# Patient Record
Sex: FEMALE | Race: WHITE | NOT HISPANIC OR LATINO | Employment: OTHER | URBAN - METROPOLITAN AREA
[De-identification: names, ages, dates, MRNs, and addresses within clinical notes are randomized per-mention and may not be internally consistent; named-entity substitution may affect disease eponyms.]

---

## 2017-02-01 RX ORDER — SPIRONOLACTONE 25 MG/1
TABLET ORAL
Qty: 30 TABLET | Refills: 2 | Status: SHIPPED | OUTPATIENT
Start: 2017-02-01 | End: 2017-05-16 | Stop reason: SDUPTHER

## 2017-02-01 RX ORDER — DOXYCYCLINE HYCLATE 100 MG
TABLET ORAL
Qty: 30 TABLET | Refills: 2 | Status: SHIPPED | OUTPATIENT
Start: 2017-02-01 | End: 2017-04-28 | Stop reason: SDUPTHER

## 2017-02-17 RX ORDER — CARVEDILOL 6.25 MG/1
TABLET ORAL
Qty: 180 TABLET | Refills: 0 | Status: SHIPPED | OUTPATIENT
Start: 2017-02-17 | End: 2017-05-14 | Stop reason: SDUPTHER

## 2017-03-10 RX ORDER — LOSARTAN POTASSIUM 50 MG/1
TABLET ORAL
Qty: 30 TABLET | Refills: 2 | Status: SHIPPED | OUTPATIENT
Start: 2017-03-10 | End: 2017-05-11 | Stop reason: SDUPTHER

## 2017-04-06 RX ORDER — GLIMEPIRIDE 2 MG/1
TABLET ORAL
Qty: 60 TABLET | Refills: 2 | Status: SHIPPED | OUTPATIENT
Start: 2017-04-06 | End: 2017-05-11 | Stop reason: SDUPTHER

## 2017-04-12 ENCOUNTER — OFFICE VISIT (OUTPATIENT)
Dept: FAMILY MEDICINE CLINIC | Facility: CLINIC | Age: 82
End: 2017-04-12

## 2017-04-12 VITALS
HEART RATE: 67 BPM | DIASTOLIC BLOOD PRESSURE: 70 MMHG | WEIGHT: 158.4 LBS | SYSTOLIC BLOOD PRESSURE: 150 MMHG | HEIGHT: 62 IN | OXYGEN SATURATION: 98 % | BODY MASS INDEX: 29.15 KG/M2 | RESPIRATION RATE: 16 BRPM | TEMPERATURE: 97.8 F

## 2017-04-12 DIAGNOSIS — E11.9 DIABETES MELLITUS TYPE II, NON INSULIN DEPENDENT (HCC): ICD-10-CM

## 2017-04-12 DIAGNOSIS — I10 ESSENTIAL HYPERTENSION: Primary | ICD-10-CM

## 2017-04-12 DIAGNOSIS — N18.30 CHRONIC KIDNEY DISEASE, STAGE III (MODERATE) (HCC): ICD-10-CM

## 2017-04-12 DIAGNOSIS — E78.00 PURE HYPERCHOLESTEROLEMIA: ICD-10-CM

## 2017-04-12 LAB
ALBUMIN SERPL-MCNC: 4.4 G/DL (ref 3.5–5.2)
ALBUMIN/GLOB SERPL: 1.6 G/DL
ALP SERPL-CCNC: 139 U/L (ref 39–117)
ALT SERPL-CCNC: 14 U/L (ref 1–33)
AST SERPL-CCNC: 15 U/L (ref 1–32)
BILIRUB SERPL-MCNC: 1 MG/DL (ref 0.1–1.2)
BUN SERPL-MCNC: 16 MG/DL (ref 8–23)
BUN/CREAT SERPL: 11.5 (ref 7–25)
CALCIUM SERPL-MCNC: 10.5 MG/DL (ref 8.6–10.5)
CHLORIDE SERPL-SCNC: 101 MMOL/L (ref 98–107)
CHOLEST SERPL-MCNC: 194 MG/DL (ref 0–200)
CO2 SERPL-SCNC: 25.7 MMOL/L (ref 22–29)
CREAT SERPL-MCNC: 1.39 MG/DL (ref 0.57–1)
GLOBULIN SER CALC-MCNC: 2.7 GM/DL
GLUCOSE SERPL-MCNC: 161 MG/DL (ref 65–99)
HBA1C MFR BLD: 6.56 % (ref 4.8–5.6)
HDLC SERPL-MCNC: 35 MG/DL (ref 40–60)
LDLC SERPL CALC-MCNC: 126 MG/DL (ref 0–100)
POTASSIUM SERPL-SCNC: 5.2 MMOL/L (ref 3.5–5.2)
PROT SERPL-MCNC: 7.1 G/DL (ref 6–8.5)
SODIUM SERPL-SCNC: 142 MMOL/L (ref 136–145)
TRIGL SERPL-MCNC: 166 MG/DL (ref 0–150)
VLDLC SERPL CALC-MCNC: 33.2 MG/DL (ref 5–40)

## 2017-04-12 PROCEDURE — 99214 OFFICE O/P EST MOD 30 MIN: CPT | Performed by: INTERNAL MEDICINE

## 2017-04-12 RX ORDER — HYDROCHLOROTHIAZIDE 12.5 MG/1
12.5 CAPSULE, GELATIN COATED ORAL DAILY
Qty: 90 CAPSULE | Refills: 1 | Status: SHIPPED | OUTPATIENT
Start: 2017-04-12 | End: 2017-09-16 | Stop reason: SDUPTHER

## 2017-04-12 NOTE — PROGRESS NOTES
"Subjective   Patient ID: Etelvina Bales is a 83 y.o. female presents with   Chief Complaint   Patient presents with   • Diabetes     6 month check, pt is fasting for labs       HPI - this patient presents today for follow-up of hypertension hypercholesterolemia type 2 diabetes and chronic kidney disease.  Overall she's feeling well.  She does feel like she's a little short of breath at times and her blood pressures just a shade high.  She has seen the cardiologist and last year she has no chest pain and no orthopnea.  Overall she describes herself as feeling good.    Assessment plan    Type 2 diabetes-Amaryl 2, we'll get an A1c    Hypertension continue losartan 50, Coreg Aldactone 25 and I'll start hydrochlorothiazide 12-1/2 we'll get a CMP    Chronic kidney disease CMP    Hyperlipidemia-fasting lipid profile    Allergies   Allergen Reactions   • Ace Inhibitors    • Codeine    • Lisinopril Cough       The following portions of the patient's history were reviewed and updated as appropriate: allergies, current medications, past family history, past medical history, past social history, past surgical history and problem list.      Review of Systems   Constitutional: Negative.    HENT: Negative.    Eyes: Negative.    Respiratory: Positive for shortness of breath.    Cardiovascular: Negative.    Gastrointestinal: Negative.    Endocrine: Negative.    Genitourinary: Negative.    Musculoskeletal: Negative.    Skin: Negative.    Allergic/Immunologic: Negative.    Neurological: Negative.    Hematological: Negative.    Psychiatric/Behavioral: Negative.        Objective     Vitals:    04/12/17 1019   BP: 150/70   Pulse: 67   Resp: 16   Temp: 97.8 °F (36.6 °C)   TempSrc: Oral   SpO2: 98%   Weight: 158 lb 6.4 oz (71.8 kg)   Height: 62\" (157.5 cm)         Physical Exam   Constitutional: She is oriented to person, place, and time. She appears well-developed and well-nourished. No distress.   HENT:   Head: Normocephalic and " atraumatic.   Eyes: Conjunctivae and EOM are normal. Pupils are equal, round, and reactive to light. Right eye exhibits no discharge. Left eye exhibits no discharge. No scleral icterus.   Neck: Normal range of motion. Neck supple. No tracheal deviation present. No thyromegaly present.   Cardiovascular: Normal rate, regular rhythm, normal heart sounds and normal pulses.  Exam reveals no gallop.    No murmur heard.  Pulmonary/Chest: Effort normal and breath sounds normal. No respiratory distress. She has no wheezes. She has no rales. She exhibits no tenderness.   Abdominal: Soft. She exhibits no distension. There is no tenderness.   Musculoskeletal: Normal range of motion.   Neurological: She is alert and oriented to person, place, and time.   Skin: Skin is warm and dry. No rash noted. No erythema. No pallor.   Psychiatric: She has a normal mood and affect. Her behavior is normal. Judgment and thought content normal.   Nursing note and vitals reviewed.        Etelvina was seen today for diabetes.    Diagnoses and all orders for this visit:    Essential hypertension  -     Lipid Panel  -     Hemoglobin A1c  -     Comprehensive Metabolic Panel    Pure hypercholesterolemia  -     Lipid Panel  -     Hemoglobin A1c  -     Comprehensive Metabolic Panel    Diabetes mellitus type II, non insulin dependent  -     Lipid Panel  -     Hemoglobin A1c  -     Comprehensive Metabolic Panel    Chronic kidney disease, stage III (moderate)  -     Lipid Panel  -     Hemoglobin A1c  -     Comprehensive Metabolic Panel    Other orders  -     hydrochlorothiazide (MICROZIDE) 12.5 MG capsule; Take 1 capsule by mouth Daily.        Call or return to clinic prn if these symptoms worsen or fail to improve as anticipated.

## 2017-04-28 RX ORDER — DOXYCYCLINE HYCLATE 100 MG
TABLET ORAL
Qty: 30 TABLET | Refills: 1 | Status: SHIPPED | OUTPATIENT
Start: 2017-04-28 | End: 2017-07-14 | Stop reason: SDUPTHER

## 2017-05-11 RX ORDER — LOSARTAN POTASSIUM 50 MG/1
50 TABLET ORAL DAILY
Qty: 90 TABLET | Refills: 2 | Status: SHIPPED | OUTPATIENT
Start: 2017-05-11 | End: 2017-12-15 | Stop reason: SDUPTHER

## 2017-05-11 RX ORDER — GLIMEPIRIDE 2 MG/1
2 TABLET ORAL 2 TIMES DAILY
Qty: 180 TABLET | Refills: 2 | Status: SHIPPED | OUTPATIENT
Start: 2017-05-11 | End: 2018-04-16 | Stop reason: SDUPTHER

## 2017-05-15 RX ORDER — CARVEDILOL 6.25 MG/1
TABLET ORAL
Qty: 180 TABLET | Refills: 0 | Status: SHIPPED | OUTPATIENT
Start: 2017-05-15 | End: 2017-08-15 | Stop reason: SDUPTHER

## 2017-05-16 RX ORDER — SPIRONOLACTONE 25 MG/1
TABLET ORAL
Qty: 30 TABLET | Refills: 1 | Status: SHIPPED | OUTPATIENT
Start: 2017-05-16 | End: 2017-07-21 | Stop reason: SDUPTHER

## 2017-06-27 RX ORDER — DOXYCYCLINE HYCLATE 100 MG
TABLET ORAL
Qty: 30 TABLET | Refills: 0 | OUTPATIENT
Start: 2017-06-27

## 2017-07-14 RX ORDER — DOXYCYCLINE HYCLATE 100 MG
TABLET ORAL
Qty: 30 TABLET | Refills: 0 | Status: SHIPPED | OUTPATIENT
Start: 2017-07-14 | End: 2017-08-15 | Stop reason: SDUPTHER

## 2017-07-21 RX ORDER — SPIRONOLACTONE 25 MG/1
TABLET ORAL
Qty: 30 TABLET | Refills: 0 | Status: SHIPPED | OUTPATIENT
Start: 2017-07-21 | End: 2017-08-15 | Stop reason: SDUPTHER

## 2017-08-15 RX ORDER — CARVEDILOL 6.25 MG/1
TABLET ORAL
Qty: 180 TABLET | Refills: 0 | Status: SHIPPED | OUTPATIENT
Start: 2017-08-15 | End: 2017-10-17 | Stop reason: SDUPTHER

## 2017-08-15 RX ORDER — SPIRONOLACTONE 25 MG/1
TABLET ORAL
Qty: 30 TABLET | Refills: 0 | Status: SHIPPED | OUTPATIENT
Start: 2017-08-15 | End: 2017-09-16 | Stop reason: SDUPTHER

## 2017-08-15 RX ORDER — DOXYCYCLINE HYCLATE 100 MG
TABLET ORAL
Qty: 30 TABLET | Refills: 0 | Status: SHIPPED | OUTPATIENT
Start: 2017-08-15 | End: 2018-04-27

## 2017-08-17 RX ORDER — DOXYCYCLINE HYCLATE 100 MG
TABLET ORAL
Qty: 30 TABLET | Refills: 0 | Status: SHIPPED | OUTPATIENT
Start: 2017-08-17 | End: 2017-10-17 | Stop reason: SDUPTHER

## 2017-09-18 RX ORDER — SPIRONOLACTONE 25 MG/1
TABLET ORAL
Qty: 30 TABLET | Refills: 0 | Status: SHIPPED | OUTPATIENT
Start: 2017-09-18 | End: 2017-10-17 | Stop reason: SDUPTHER

## 2017-09-18 RX ORDER — HYDROCHLOROTHIAZIDE 12.5 MG/1
CAPSULE, GELATIN COATED ORAL
Qty: 90 CAPSULE | Refills: 0 | Status: SHIPPED | OUTPATIENT
Start: 2017-09-18 | End: 2017-10-27

## 2017-10-17 RX ORDER — SPIRONOLACTONE 25 MG/1
TABLET ORAL
Qty: 30 TABLET | Refills: 0 | Status: SHIPPED | OUTPATIENT
Start: 2017-10-17 | End: 2017-11-16 | Stop reason: SDUPTHER

## 2017-10-17 RX ORDER — DOXYCYCLINE HYCLATE 100 MG
TABLET ORAL
Qty: 30 TABLET | Refills: 0 | Status: SHIPPED | OUTPATIENT
Start: 2017-10-17 | End: 2018-04-27

## 2017-10-17 RX ORDER — CARVEDILOL 6.25 MG/1
TABLET ORAL
Qty: 180 TABLET | Refills: 0 | Status: SHIPPED | OUTPATIENT
Start: 2017-10-17 | End: 2018-02-16 | Stop reason: SDUPTHER

## 2017-10-27 ENCOUNTER — OFFICE VISIT (OUTPATIENT)
Dept: FAMILY MEDICINE CLINIC | Facility: CLINIC | Age: 82
End: 2017-10-27

## 2017-10-27 VITALS
HEART RATE: 58 BPM | SYSTOLIC BLOOD PRESSURE: 100 MMHG | BODY MASS INDEX: 27.6 KG/M2 | RESPIRATION RATE: 16 BRPM | HEIGHT: 62 IN | DIASTOLIC BLOOD PRESSURE: 68 MMHG | WEIGHT: 150 LBS | TEMPERATURE: 98 F | OXYGEN SATURATION: 97 %

## 2017-10-27 DIAGNOSIS — I10 ESSENTIAL HYPERTENSION: Primary | ICD-10-CM

## 2017-10-27 DIAGNOSIS — Z00.00 HEALTH CARE MAINTENANCE: ICD-10-CM

## 2017-10-27 DIAGNOSIS — Z23 NEED FOR VACCINATION: ICD-10-CM

## 2017-10-27 DIAGNOSIS — E11.9 DIABETES MELLITUS TYPE II, NON INSULIN DEPENDENT (HCC): ICD-10-CM

## 2017-10-27 DIAGNOSIS — E78.00 PURE HYPERCHOLESTEROLEMIA: ICD-10-CM

## 2017-10-27 DIAGNOSIS — N18.30 CHRONIC KIDNEY DISEASE, STAGE III (MODERATE) (HCC): ICD-10-CM

## 2017-10-27 PROCEDURE — 99214 OFFICE O/P EST MOD 30 MIN: CPT | Performed by: INTERNAL MEDICINE

## 2017-10-27 PROCEDURE — 90662 IIV NO PRSV INCREASED AG IM: CPT | Performed by: INTERNAL MEDICINE

## 2017-10-27 PROCEDURE — G0008 ADMIN INFLUENZA VIRUS VAC: HCPCS | Performed by: INTERNAL MEDICINE

## 2017-10-27 NOTE — PROGRESS NOTES
"Subjective   Patient ID: Etelvina Bales is a 83 y.o. female presents with   Chief Complaint   Patient presents with   • Diabetes     f/u, fasting for labs   • Medication Reaction     The Hctz made her bp go to 97/47 and she stopped taking this makes her dizzy       HPI - This patient presents today for follow-up of essential hypertension hypercholesterolemia type 2 diabetes stage III chronic kidney disease.  She could not tolerate hydrochlorothiazide last time it made her dizzy blood pressure fell down to low.  She refuses mammograms.  She is oriented fully vaccine.    Assessment plan    Essential hypertension and congestive heart failure continue carvedilol losartan and spironolactone    Hypercholesterolemia will get a fasting lipid profile    Type 2 diabetes Amaryl will get an A1c    Health care maintenance she's had flu vaccine but refuses mammogram.    Allergies   Allergen Reactions   • Ace Inhibitors    • Codeine    • Lisinopril Cough       The following portions of the patient's history were reviewed and updated as appropriate: allergies, current medications, past family history, past medical history, past social history, past surgical history and problem list.      Review of Systems   Constitutional: Negative.    HENT: Negative.    Eyes: Negative.    Respiratory: Negative.    Cardiovascular: Negative.    Gastrointestinal: Negative.    Endocrine: Negative.    Genitourinary: Negative.    Musculoskeletal: Positive for gait problem.   Skin: Negative.    Allergic/Immunologic: Negative.    Hematological: Negative.    Psychiatric/Behavioral: Negative.        Objective     Vitals:    10/27/17 1012   BP: 100/68   Pulse: 58   Resp: 16   Temp: 98 °F (36.7 °C)   TempSrc: Oral   SpO2: 97%   Weight: 150 lb (68 kg)   Height: 62\" (157.5 cm)         Physical Exam   Constitutional: She is oriented to person, place, and time. She appears well-developed and well-nourished. No distress.   Elderly and frail-appearing   HENT: "   Head: Normocephalic and atraumatic.   Eyes: Conjunctivae and EOM are normal. Pupils are equal, round, and reactive to light. Right eye exhibits no discharge. Left eye exhibits no discharge. No scleral icterus.   Neck: Normal range of motion. Neck supple. No tracheal deviation present. No thyromegaly present.   Cardiovascular: Normal rate, regular rhythm, normal heart sounds and normal pulses.  Exam reveals no gallop.    No murmur heard.  Pulmonary/Chest: Effort normal and breath sounds normal. No respiratory distress. She has no wheezes. She has no rales. She exhibits no tenderness.   Abdominal: Soft. Bowel sounds are normal. There is no tenderness.   Musculoskeletal: Normal range of motion.   Neurological: She is alert and oriented to person, place, and time.   Skin: Skin is warm and dry. No rash noted. No erythema. No pallor.   Psychiatric: She has a normal mood and affect. Her behavior is normal. Judgment and thought content normal.   Nursing note and vitals reviewed.        Etelvina was seen today for diabetes and medication reaction.    Diagnoses and all orders for this visit:    Essential hypertension  -     Lipid Panel  -     Hemoglobin A1c  -     Microalbumin / Creatinine Urine Ratio - Urine, Clean Catch  -     Comprehensive Metabolic Panel    Pure hypercholesterolemia  -     Lipid Panel  -     Hemoglobin A1c  -     Microalbumin / Creatinine Urine Ratio - Urine, Clean Catch  -     Comprehensive Metabolic Panel    Diabetes mellitus type II, non insulin dependent  -     Lipid Panel  -     Hemoglobin A1c  -     Microalbumin / Creatinine Urine Ratio - Urine, Clean Catch  -     Comprehensive Metabolic Panel    Chronic kidney disease, stage III (moderate)  -     Lipid Panel  -     Hemoglobin A1c  -     Microalbumin / Creatinine Urine Ratio - Urine, Clean Catch  -     Comprehensive Metabolic Panel    Health care maintenance  -     Lipid Panel  -     Hemoglobin A1c  -     Microalbumin / Creatinine Urine Ratio -  Urine, Clean Catch  -     Comprehensive Metabolic Panel        Call or return to clinic prn if these symptoms worsen or fail to improve as anticipated.

## 2017-10-28 LAB
ALBUMIN SERPL-MCNC: 4.5 G/DL (ref 3.5–5.2)
ALBUMIN/CREAT UR: 13.2 MG/G CREAT (ref 0–30)
ALBUMIN/GLOB SERPL: 1.9 G/DL
ALP SERPL-CCNC: 130 U/L (ref 39–117)
ALT SERPL-CCNC: 13 U/L (ref 1–33)
AST SERPL-CCNC: 11 U/L (ref 1–32)
BILIRUB SERPL-MCNC: 0.8 MG/DL (ref 0.1–1.2)
BUN SERPL-MCNC: 14 MG/DL (ref 8–23)
BUN/CREAT SERPL: 11 (ref 7–25)
CALCIUM SERPL-MCNC: 10 MG/DL (ref 8.6–10.5)
CHLORIDE SERPL-SCNC: 100 MMOL/L (ref 98–107)
CHOLEST SERPL-MCNC: 192 MG/DL (ref 0–200)
CO2 SERPL-SCNC: 25.6 MMOL/L (ref 22–29)
CREAT SERPL-MCNC: 1.27 MG/DL (ref 0.57–1)
CREAT UR-MCNC: 150.6 MG/DL
GFR SERPLBLD CREATININE-BSD FMLA CKD-EPI: 40 ML/MIN/1.73
GFR SERPLBLD CREATININE-BSD FMLA CKD-EPI: 49 ML/MIN/1.73
GLOBULIN SER CALC-MCNC: 2.4 GM/DL
GLUCOSE SERPL-MCNC: 142 MG/DL (ref 65–99)
HBA1C MFR BLD: 6.5 % (ref 4.8–5.6)
HDLC SERPL-MCNC: 38 MG/DL (ref 40–60)
LDLC SERPL CALC-MCNC: 124 MG/DL (ref 0–100)
MICROALBUMIN UR-MCNC: 19.9 UG/ML
POTASSIUM SERPL-SCNC: 4.2 MMOL/L (ref 3.5–5.2)
PROT SERPL-MCNC: 6.9 G/DL (ref 6–8.5)
SODIUM SERPL-SCNC: 139 MMOL/L (ref 136–145)
TRIGL SERPL-MCNC: 150 MG/DL (ref 0–150)
VLDLC SERPL CALC-MCNC: 30 MG/DL (ref 5–40)

## 2017-10-30 NOTE — PROGRESS NOTES
Pt informed and she requested a copy be mailed to her home, I have put this in the mail for her today.

## 2017-11-17 RX ORDER — SPIRONOLACTONE 25 MG/1
TABLET ORAL
Qty: 30 TABLET | Refills: 3 | Status: SHIPPED | OUTPATIENT
Start: 2017-11-17 | End: 2018-03-18 | Stop reason: SDUPTHER

## 2017-11-17 RX ORDER — DOXYCYCLINE HYCLATE 100 MG
TABLET ORAL
Qty: 30 TABLET | Refills: 3 | Status: SHIPPED | OUTPATIENT
Start: 2017-11-17 | End: 2018-03-18 | Stop reason: SDUPTHER

## 2017-12-15 RX ORDER — HYDROCHLOROTHIAZIDE 12.5 MG/1
CAPSULE, GELATIN COATED ORAL
Qty: 90 CAPSULE | Refills: 0 | Status: SHIPPED | OUTPATIENT
Start: 2017-12-15 | End: 2018-03-18 | Stop reason: SDUPTHER

## 2017-12-15 RX ORDER — LOSARTAN POTASSIUM 50 MG/1
TABLET ORAL
Qty: 66 TABLET | Refills: 1 | Status: SHIPPED | OUTPATIENT
Start: 2017-12-15 | End: 2018-02-16 | Stop reason: SDUPTHER

## 2018-01-01 DIAGNOSIS — L71.9 ROSACEA: Primary | ICD-10-CM

## 2018-01-01 RX ORDER — LOSARTAN POTASSIUM 50 MG/1
50 TABLET ORAL DAILY
Qty: 90 TABLET | Refills: 0 | Status: SHIPPED | OUTPATIENT
Start: 2018-01-01

## 2018-01-01 RX ORDER — DOXYCYCLINE HYCLATE 100 MG
TABLET ORAL
Qty: 30 TABLET | Refills: 0 | OUTPATIENT
Start: 2018-01-01

## 2018-01-01 RX ORDER — DOXYCYCLINE HYCLATE 100 MG
TABLET ORAL
Qty: 30 TABLET | Refills: 0 | Status: SHIPPED | OUTPATIENT
Start: 2018-01-01 | End: 2019-01-01 | Stop reason: SDUPTHER

## 2018-01-01 RX ORDER — GLIMEPIRIDE 2 MG/1
2 TABLET ORAL 2 TIMES DAILY
Qty: 180 TABLET | Refills: 0 | Status: SHIPPED | OUTPATIENT
Start: 2018-01-01

## 2018-01-01 RX ORDER — SPIRONOLACTONE 25 MG/1
25 TABLET ORAL DAILY
Qty: 30 TABLET | Refills: 2 | Status: ON HOLD | OUTPATIENT
Start: 2018-01-01 | End: 2019-01-01

## 2018-01-01 RX ORDER — DOXYCYCLINE 40 MG/1
40 CAPSULE ORAL EVERY MORNING
Qty: 90 CAPSULE | Refills: 0 | Status: SHIPPED | OUTPATIENT
Start: 2018-01-01 | End: 2019-01-01

## 2018-01-01 RX ORDER — HYDROCHLOROTHIAZIDE 12.5 MG/1
12.5 CAPSULE, GELATIN COATED ORAL DAILY
Qty: 90 CAPSULE | Refills: 1 | Status: SHIPPED | OUTPATIENT
Start: 2018-01-01

## 2018-02-16 RX ORDER — CARVEDILOL 6.25 MG/1
TABLET ORAL
Qty: 180 TABLET | Refills: 0 | Status: SHIPPED | OUTPATIENT
Start: 2018-02-16 | End: 2018-04-27 | Stop reason: SDUPTHER

## 2018-02-16 RX ORDER — LOSARTAN POTASSIUM 50 MG/1
TABLET ORAL
Qty: 66 TABLET | Refills: 0 | Status: SHIPPED | OUTPATIENT
Start: 2018-02-16 | End: 2018-04-27 | Stop reason: CLARIF

## 2018-03-19 RX ORDER — HYDROCHLOROTHIAZIDE 12.5 MG/1
CAPSULE, GELATIN COATED ORAL
Qty: 88 CAPSULE | Refills: 0 | Status: SHIPPED | OUTPATIENT
Start: 2018-03-19 | End: 2018-04-27 | Stop reason: SDUPTHER

## 2018-03-19 RX ORDER — SPIRONOLACTONE 25 MG/1
TABLET ORAL
Qty: 30 TABLET | Refills: 2 | Status: SHIPPED | OUTPATIENT
Start: 2018-03-19 | End: 2018-04-27 | Stop reason: CLARIF

## 2018-03-19 RX ORDER — DOXYCYCLINE HYCLATE 100 MG
TABLET ORAL
Qty: 30 TABLET | Refills: 2 | Status: SHIPPED | OUTPATIENT
Start: 2018-03-19 | End: 2018-06-13 | Stop reason: SDUPTHER

## 2018-04-17 RX ORDER — GLIMEPIRIDE 2 MG/1
TABLET ORAL
Qty: 180 TABLET | Refills: 0 | Status: SHIPPED | OUTPATIENT
Start: 2018-04-17 | End: 2018-04-27 | Stop reason: SDUPTHER

## 2018-04-27 ENCOUNTER — OFFICE VISIT (OUTPATIENT)
Dept: FAMILY MEDICINE CLINIC | Facility: CLINIC | Age: 83
End: 2018-04-27

## 2018-04-27 VITALS
SYSTOLIC BLOOD PRESSURE: 136 MMHG | DIASTOLIC BLOOD PRESSURE: 68 MMHG | TEMPERATURE: 98 F | HEART RATE: 68 BPM | HEIGHT: 62 IN | WEIGHT: 144 LBS | BODY MASS INDEX: 26.5 KG/M2 | RESPIRATION RATE: 16 BRPM | OXYGEN SATURATION: 98 %

## 2018-04-27 DIAGNOSIS — E78.00 PURE HYPERCHOLESTEROLEMIA: Primary | ICD-10-CM

## 2018-04-27 DIAGNOSIS — I10 ESSENTIAL HYPERTENSION: ICD-10-CM

## 2018-04-27 DIAGNOSIS — E87.5 HYPERKALEMIA: ICD-10-CM

## 2018-04-27 DIAGNOSIS — E11.9 DIABETES MELLITUS TYPE II, NON INSULIN DEPENDENT (HCC): ICD-10-CM

## 2018-04-27 DIAGNOSIS — N18.30 CHRONIC KIDNEY DISEASE, STAGE III (MODERATE) (HCC): ICD-10-CM

## 2018-04-27 PROCEDURE — 99214 OFFICE O/P EST MOD 30 MIN: CPT | Performed by: INTERNAL MEDICINE

## 2018-04-27 RX ORDER — SPIRONOLACTONE 25 MG/1
TABLET ORAL
Qty: 30 TABLET | Refills: 2 | Status: SHIPPED | OUTPATIENT
Start: 2018-04-27 | End: 2018-01-01 | Stop reason: SDUPTHER

## 2018-04-27 RX ORDER — GLIMEPIRIDE 2 MG/1
TABLET ORAL
Qty: 180 TABLET | Refills: 0 | Status: SHIPPED | OUTPATIENT
Start: 2018-04-27 | End: 2018-01-01 | Stop reason: SDUPTHER

## 2018-04-27 RX ORDER — HYDROCHLOROTHIAZIDE 12.5 MG/1
CAPSULE, GELATIN COATED ORAL
Qty: 88 CAPSULE | Refills: 0 | Status: SHIPPED | OUTPATIENT
Start: 2018-04-27 | End: 2018-01-01 | Stop reason: SDUPTHER

## 2018-04-27 RX ORDER — CARVEDILOL 6.25 MG/1
TABLET ORAL
Qty: 180 TABLET | Refills: 0 | Status: SHIPPED | OUTPATIENT
Start: 2018-04-27 | End: 2018-08-20 | Stop reason: SDUPTHER

## 2018-04-27 RX ORDER — LOSARTAN POTASSIUM 50 MG/1
TABLET ORAL
Qty: 66 TABLET | Refills: 0 | Status: SHIPPED | OUTPATIENT
Start: 2018-04-27 | End: 2018-01-01 | Stop reason: SDUPTHER

## 2018-04-27 NOTE — PROGRESS NOTES
"Subjective   Patient ID: Etelvina Bales is a 84 y.o. female presents with   Chief Complaint   Patient presents with   • Hypertension       HPI - this patient presents today for follow-up of hypertension chronic kidney disease type 2 diabetesHyperkalemia and hypercholesterolemia.  Overall she feels good and is taking good care of herself she needs lab work today.    Assessment plan    Hypertension and congestive heart failure continue losartan and Aldactone and Coreg will get a metabolic panel    Hyperkalemia check a metabolic panel    Type 2 diabetes continue current regimen A1c    Hypercholesterolemia-check a fasting lipid profile        Allergies   Allergen Reactions   • Ace Inhibitors    • Codeine    • Lisinopril Cough       The following portions of the patient's history were reviewed and updated as appropriate: allergies, current medications, past family history, past medical history, past social history, past surgical history and problem list.      Review of Systems   Constitutional: Negative.    HENT: Negative.    Eyes: Negative.    Respiratory: Negative.    Cardiovascular: Negative.    Gastrointestinal: Negative.    Endocrine: Negative.    Genitourinary: Negative.    Musculoskeletal: Negative.    Skin: Negative.    Allergic/Immunologic: Negative.    Neurological: Negative.    Hematological: Negative.    Psychiatric/Behavioral: Negative.        Objective     Vitals:    04/27/18 0914   BP: 136/68   Pulse: 68   Resp: 16   Temp: 98 °F (36.7 °C)   TempSrc: Oral   SpO2: 98%   Weight: 65.3 kg (144 lb)   Height: 157.5 cm (62\")         Physical Exam   Constitutional: She is oriented to person, place, and time. She appears well-developed and well-nourished. No distress.   HENT:   Head: Normocephalic and atraumatic.   Eyes: Conjunctivae and EOM are normal. Pupils are equal, round, and reactive to light. Right eye exhibits no discharge. Left eye exhibits no discharge. No scleral icterus.   Neck: Normal range of " motion. Neck supple. No tracheal deviation present. No thyromegaly present.   Cardiovascular: Normal rate, regular rhythm, normal heart sounds and normal pulses.  Exam reveals no gallop.    No murmur heard.  Pulmonary/Chest: Effort normal and breath sounds normal. No respiratory distress. She has no wheezes. She has no rales. She exhibits no tenderness.   Abdominal: There is no tenderness.   Musculoskeletal: Normal range of motion.   Neurological: She is alert and oriented to person, place, and time. She exhibits normal muscle tone. Coordination normal.   Skin: Skin is warm and dry. No rash noted. No erythema. No pallor.   Psychiatric: She has a normal mood and affect. Her behavior is normal. Judgment and thought content normal.   Nursing note and vitals reviewed.        Etelvina was seen today for hypertension.    Diagnoses and all orders for this visit:    Pure hypercholesterolemia  -     Hemoglobin A1c  -     Lipid Panel  -     Microalbumin / Creatinine Urine Ratio - Urine, Clean Catch  -     Comprehensive Metabolic Panel    Diabetes mellitus type II, non insulin dependent  -     Hemoglobin A1c  -     Lipid Panel  -     Microalbumin / Creatinine Urine Ratio - Urine, Clean Catch  -     Comprehensive Metabolic Panel    Chronic kidney disease, stage III (moderate)  -     Hemoglobin A1c  -     Lipid Panel  -     Microalbumin / Creatinine Urine Ratio - Urine, Clean Catch  -     Comprehensive Metabolic Panel    Hyperkalemia  -     Hemoglobin A1c  -     Lipid Panel  -     Microalbumin / Creatinine Urine Ratio - Urine, Clean Catch  -     Comprehensive Metabolic Panel    Essential hypertension  -     Hemoglobin A1c  -     Lipid Panel  -     Microalbumin / Creatinine Urine Ratio - Urine, Clean Catch  -     Comprehensive Metabolic Panel        Call or return to clinic prn if these symptoms worsen or fail to improve as anticipated.

## 2018-04-28 LAB
ALBUMIN SERPL-MCNC: 4.5 G/DL (ref 3.5–5.2)
ALBUMIN/CREAT UR: 15.1 MG/G CREAT (ref 0–30)
ALBUMIN/GLOB SERPL: 2 G/DL
ALP SERPL-CCNC: 116 U/L (ref 39–117)
ALT SERPL-CCNC: 9 U/L (ref 1–33)
AST SERPL-CCNC: 13 U/L (ref 1–32)
BILIRUB SERPL-MCNC: 1 MG/DL (ref 0.1–1.2)
BUN SERPL-MCNC: 17 MG/DL (ref 8–23)
BUN/CREAT SERPL: 13.4 (ref 7–25)
CALCIUM SERPL-MCNC: 10 MG/DL (ref 8.6–10.5)
CHLORIDE SERPL-SCNC: 98 MMOL/L (ref 98–107)
CHOLEST SERPL-MCNC: 192 MG/DL (ref 0–200)
CO2 SERPL-SCNC: 28.6 MMOL/L (ref 22–29)
CREAT SERPL-MCNC: 1.27 MG/DL (ref 0.57–1)
CREAT UR-MCNC: 134.6 MG/DL
GFR SERPLBLD CREATININE-BSD FMLA CKD-EPI: 40 ML/MIN/1.73
GFR SERPLBLD CREATININE-BSD FMLA CKD-EPI: 49 ML/MIN/1.73
GLOBULIN SER CALC-MCNC: 2.2 GM/DL
GLUCOSE SERPL-MCNC: 102 MG/DL (ref 65–99)
HBA1C MFR BLD: 5.73 % (ref 4.8–5.6)
HDLC SERPL-MCNC: 39 MG/DL (ref 40–60)
LDLC SERPL CALC-MCNC: 126 MG/DL (ref 0–100)
MICROALBUMIN UR-MCNC: 20.3 UG/ML
POTASSIUM SERPL-SCNC: 4.1 MMOL/L (ref 3.5–5.2)
PROT SERPL-MCNC: 6.7 G/DL (ref 6–8.5)
SODIUM SERPL-SCNC: 139 MMOL/L (ref 136–145)
TRIGL SERPL-MCNC: 137 MG/DL (ref 0–150)
VLDLC SERPL CALC-MCNC: 27.4 MG/DL (ref 5–40)

## 2018-05-15 ENCOUNTER — TELEPHONE (OUTPATIENT)
Dept: FAMILY MEDICINE CLINIC | Facility: CLINIC | Age: 83
End: 2018-05-15

## 2018-05-16 RX ORDER — MONTELUKAST SODIUM 10 MG/1
10 TABLET ORAL NIGHTLY
Qty: 30 TABLET | Refills: 2 | Status: SHIPPED | OUTPATIENT
Start: 2018-05-16

## 2018-06-13 ENCOUNTER — TELEPHONE (OUTPATIENT)
Dept: FAMILY MEDICINE CLINIC | Facility: CLINIC | Age: 83
End: 2018-06-13

## 2018-06-13 RX ORDER — DOXYCYCLINE HYCLATE 100 MG
100 TABLET ORAL DAILY
Qty: 30 TABLET | Refills: 0 | Status: SHIPPED | OUTPATIENT
Start: 2018-06-13 | End: 2018-01-01 | Stop reason: DRUGHIGH

## 2018-06-13 NOTE — TELEPHONE ENCOUNTER
catherine requesting refill on doxycycline 100 mg #30. She is former pt of [facility] and will be seeing Molly on 11/30/18

## 2018-08-10 RX ORDER — DOXYCYCLINE HYCLATE 100 MG
TABLET ORAL
Qty: 30 TABLET | Refills: 0 | OUTPATIENT
Start: 2018-08-10

## 2018-08-20 RX ORDER — CARVEDILOL 6.25 MG/1
6.25 TABLET ORAL 2 TIMES DAILY
Qty: 180 TABLET | Refills: 0 | Status: SHIPPED | OUTPATIENT
Start: 2018-08-20 | End: 2019-01-01 | Stop reason: SDUPTHER

## 2019-01-01 ENCOUNTER — HOSPITAL ENCOUNTER (INPATIENT)
Facility: HOSPITAL | Age: 84
LOS: 2 days | End: 2019-09-03
Attending: EMERGENCY MEDICINE | Admitting: HOSPITALIST

## 2019-01-01 ENCOUNTER — APPOINTMENT (OUTPATIENT)
Dept: GENERAL RADIOLOGY | Facility: HOSPITAL | Age: 84
End: 2019-01-01

## 2019-01-01 ENCOUNTER — APPOINTMENT (OUTPATIENT)
Dept: CT IMAGING | Facility: HOSPITAL | Age: 84
End: 2019-01-01

## 2019-01-01 VITALS
DIASTOLIC BLOOD PRESSURE: 59 MMHG | HEIGHT: 62 IN | BODY MASS INDEX: 22.16 KG/M2 | WEIGHT: 120.4 LBS | SYSTOLIC BLOOD PRESSURE: 109 MMHG | TEMPERATURE: 98.1 F | RESPIRATION RATE: 28 BRPM

## 2019-01-01 DIAGNOSIS — J06.9 UPPER RESPIRATORY TRACT INFECTION, UNSPECIFIED TYPE: ICD-10-CM

## 2019-01-01 DIAGNOSIS — I10 UNCONTROLLED HYPERTENSION: Primary | ICD-10-CM

## 2019-01-01 DIAGNOSIS — R44.1 VISUAL HALLUCINATIONS: ICD-10-CM

## 2019-01-01 LAB
ALBUMIN SERPL-MCNC: 3.2 G/DL (ref 3.5–5.2)
ALBUMIN SERPL-MCNC: 3.8 G/DL (ref 3.5–5.2)
ALBUMIN SERPL-MCNC: 4.1 G/DL (ref 3.5–5.2)
ALBUMIN/GLOB SERPL: 1.7 G/DL
ALP SERPL-CCNC: 95 U/L (ref 39–117)
ALT SERPL W P-5'-P-CCNC: 11 U/L (ref 1–33)
AMMONIA BLD-SCNC: 29 UMOL/L (ref 11–51)
ANION GAP SERPL CALCULATED.3IONS-SCNC: 10.2 MMOL/L (ref 5–15)
ANION GAP SERPL CALCULATED.3IONS-SCNC: 10.9 MMOL/L (ref 5–15)
ANION GAP SERPL CALCULATED.3IONS-SCNC: 12.5 MMOL/L (ref 5–15)
ANION GAP SERPL CALCULATED.3IONS-SCNC: 9.4 MMOL/L (ref 5–15)
ARTERIAL PATENCY WRIST A: POSITIVE
AST SERPL-CCNC: 18 U/L (ref 1–32)
ATMOSPHERIC PRESS: 749.2 MMHG
B PARAPERT DNA SPEC QL NAA+PROBE: NOT DETECTED
B PERT DNA SPEC QL NAA+PROBE: NOT DETECTED
BACTERIA SPEC AEROBE CULT: NORMAL
BACTERIA UR QL AUTO: ABNORMAL /HPF
BASE EXCESS BLDA CALC-SCNC: -0.5 MMOL/L (ref 0–2)
BASOPHILS # BLD AUTO: 0.04 10*3/MM3 (ref 0–0.2)
BASOPHILS NFR BLD AUTO: 0.4 % (ref 0–1.5)
BDY SITE: ABNORMAL
BILIRUB SERPL-MCNC: 0.6 MG/DL (ref 0.2–1.2)
BILIRUB UR QL STRIP: NEGATIVE
BUN BLD-MCNC: 12 MG/DL (ref 8–23)
BUN BLD-MCNC: 14 MG/DL (ref 8–23)
BUN BLD-MCNC: 15 MG/DL (ref 8–23)
BUN BLD-MCNC: 16 MG/DL (ref 8–23)
BUN/CREAT SERPL: 13.2 (ref 7–25)
BUN/CREAT SERPL: 15.8 (ref 7–25)
BUN/CREAT SERPL: 15.8 (ref 7–25)
BUN/CREAT SERPL: 17.9 (ref 7–25)
C PNEUM DNA NPH QL NAA+NON-PROBE: NOT DETECTED
CALCIUM SPEC-SCNC: 8.9 MG/DL (ref 8.6–10.5)
CALCIUM SPEC-SCNC: 9.1 MG/DL (ref 8.6–10.5)
CALCIUM SPEC-SCNC: 9.5 MG/DL (ref 8.6–10.5)
CALCIUM SPEC-SCNC: 9.6 MG/DL (ref 8.6–10.5)
CHLORIDE SERPL-SCNC: 90 MMOL/L (ref 98–107)
CHLORIDE SERPL-SCNC: 91 MMOL/L (ref 98–107)
CHLORIDE SERPL-SCNC: 94 MMOL/L (ref 98–107)
CHLORIDE SERPL-SCNC: 94 MMOL/L (ref 98–107)
CLARITY UR: CLEAR
CO2 SERPL-SCNC: 24.1 MMOL/L (ref 22–29)
CO2 SERPL-SCNC: 25.5 MMOL/L (ref 22–29)
CO2 SERPL-SCNC: 25.6 MMOL/L (ref 22–29)
CO2 SERPL-SCNC: 26.8 MMOL/L (ref 22–29)
COLOR UR: YELLOW
CREAT BLD-MCNC: 0.78 MG/DL (ref 0.57–1)
CREAT BLD-MCNC: 0.91 MG/DL (ref 0.57–1)
CREAT BLD-MCNC: 0.95 MG/DL (ref 0.57–1)
CREAT BLD-MCNC: 1.01 MG/DL (ref 0.57–1)
D-LACTATE SERPL-SCNC: 0.5 MMOL/L (ref 0.5–2)
DEPRECATED RDW RBC AUTO: 43.8 FL (ref 37–54)
DEPRECATED RDW RBC AUTO: 44.6 FL (ref 37–54)
DEPRECATED RDW RBC AUTO: 46.3 FL (ref 37–54)
EOSINOPHIL # BLD AUTO: 0.07 10*3/MM3 (ref 0–0.4)
EOSINOPHIL NFR BLD AUTO: 0.7 % (ref 0.3–6.2)
ERYTHROCYTE [DISTWIDTH] IN BLOOD BY AUTOMATED COUNT: 13.5 % (ref 12.3–15.4)
ERYTHROCYTE [DISTWIDTH] IN BLOOD BY AUTOMATED COUNT: 13.6 % (ref 12.3–15.4)
ERYTHROCYTE [DISTWIDTH] IN BLOOD BY AUTOMATED COUNT: 13.9 % (ref 12.3–15.4)
FLUAV H1 2009 PAND RNA NPH QL NAA+PROBE: NOT DETECTED
FLUAV H1 HA GENE NPH QL NAA+PROBE: NOT DETECTED
FLUAV H3 RNA NPH QL NAA+PROBE: NOT DETECTED
FLUAV SUBTYP SPEC NAA+PROBE: NOT DETECTED
FLUBV RNA ISLT QL NAA+PROBE: NOT DETECTED
GFR SERPL CREATININE-BSD FRML MDRD: 52 ML/MIN/1.73
GFR SERPL CREATININE-BSD FRML MDRD: 56 ML/MIN/1.73
GFR SERPL CREATININE-BSD FRML MDRD: 59 ML/MIN/1.73
GFR SERPL CREATININE-BSD FRML MDRD: 70 ML/MIN/1.73
GLOBULIN UR ELPH-MCNC: 2.4 GM/DL
GLUCOSE BLD-MCNC: 119 MG/DL (ref 65–99)
GLUCOSE BLD-MCNC: 141 MG/DL (ref 65–99)
GLUCOSE BLD-MCNC: 96 MG/DL (ref 65–99)
GLUCOSE BLD-MCNC: 96 MG/DL (ref 65–99)
GLUCOSE BLDC GLUCOMTR-MCNC: 109 MG/DL (ref 70–130)
GLUCOSE BLDC GLUCOMTR-MCNC: 115 MG/DL (ref 70–130)
GLUCOSE BLDC GLUCOMTR-MCNC: 115 MG/DL (ref 70–130)
GLUCOSE BLDC GLUCOMTR-MCNC: 120 MG/DL (ref 70–130)
GLUCOSE BLDC GLUCOMTR-MCNC: 141 MG/DL (ref 70–130)
GLUCOSE BLDC GLUCOMTR-MCNC: 175 MG/DL (ref 70–130)
GLUCOSE BLDC GLUCOMTR-MCNC: 186 MG/DL (ref 70–130)
GLUCOSE BLDC GLUCOMTR-MCNC: 214 MG/DL (ref 70–130)
GLUCOSE BLDC GLUCOMTR-MCNC: 91 MG/DL (ref 70–130)
GLUCOSE BLDC GLUCOMTR-MCNC: 97 MG/DL (ref 70–130)
GLUCOSE BLDC GLUCOMTR-MCNC: 98 MG/DL (ref 70–130)
GLUCOSE BLDC GLUCOMTR-MCNC: 99 MG/DL (ref 70–130)
GLUCOSE UR STRIP-MCNC: NEGATIVE MG/DL
HADV DNA SPEC NAA+PROBE: NOT DETECTED
HBA1C MFR BLD: 4.5 % (ref 4.8–5.6)
HCO3 BLDA-SCNC: 26 MMOL/L (ref 22–28)
HCOV 229E RNA SPEC QL NAA+PROBE: NOT DETECTED
HCOV HKU1 RNA SPEC QL NAA+PROBE: NOT DETECTED
HCOV NL63 RNA SPEC QL NAA+PROBE: NOT DETECTED
HCOV OC43 RNA SPEC QL NAA+PROBE: NOT DETECTED
HCT VFR BLD AUTO: 37.3 % (ref 34–46.6)
HCT VFR BLD AUTO: 39.3 % (ref 34–46.6)
HCT VFR BLD AUTO: 40.7 % (ref 34–46.6)
HGB BLD-MCNC: 12.6 G/DL (ref 12–15.9)
HGB BLD-MCNC: 13.3 G/DL (ref 12–15.9)
HGB BLD-MCNC: 13.4 G/DL (ref 12–15.9)
HGB UR QL STRIP.AUTO: NEGATIVE
HMPV RNA NPH QL NAA+NON-PROBE: NOT DETECTED
HOROWITZ INDEX BLD+IHG-RTO: 21 %
HPIV1 RNA SPEC QL NAA+PROBE: NOT DETECTED
HPIV2 RNA SPEC QL NAA+PROBE: NOT DETECTED
HPIV3 RNA NPH QL NAA+PROBE: NOT DETECTED
HPIV4 P GENE NPH QL NAA+PROBE: NOT DETECTED
HYALINE CASTS UR QL AUTO: ABNORMAL /LPF
IMM GRANULOCYTES # BLD AUTO: 0.09 10*3/MM3 (ref 0–0.05)
IMM GRANULOCYTES NFR BLD AUTO: 0.9 % (ref 0–0.5)
KETONES UR QL STRIP: ABNORMAL
LEUKOCYTE ESTERASE UR QL STRIP.AUTO: ABNORMAL
LYMPHOCYTES # BLD AUTO: 1.09 10*3/MM3 (ref 0.7–3.1)
LYMPHOCYTES NFR BLD AUTO: 10.7 % (ref 19.6–45.3)
M PNEUMO IGG SER IA-ACNC: NOT DETECTED
MAGNESIUM SERPL-MCNC: 1.9 MG/DL (ref 1.6–2.4)
MCH RBC QN AUTO: 29.6 PG (ref 26.6–33)
MCH RBC QN AUTO: 29.7 PG (ref 26.6–33)
MCH RBC QN AUTO: 30.3 PG (ref 26.6–33)
MCHC RBC AUTO-ENTMCNC: 32.9 G/DL (ref 31.5–35.7)
MCHC RBC AUTO-ENTMCNC: 33.8 G/DL (ref 31.5–35.7)
MCHC RBC AUTO-ENTMCNC: 33.8 G/DL (ref 31.5–35.7)
MCV RBC AUTO: 87.6 FL (ref 79–97)
MCV RBC AUTO: 89.5 FL (ref 79–97)
MCV RBC AUTO: 90.2 FL (ref 79–97)
MODALITY: ABNORMAL
MONOCYTES # BLD AUTO: 0.66 10*3/MM3 (ref 0.1–0.9)
MONOCYTES NFR BLD AUTO: 6.5 % (ref 5–12)
NEUTROPHILS # BLD AUTO: 8.26 10*3/MM3 (ref 1.7–7)
NEUTROPHILS NFR BLD AUTO: 80.8 % (ref 42.7–76)
NITRITE UR QL STRIP: NEGATIVE
NRBC BLD AUTO-RTO: 0 /100 WBC (ref 0–0.2)
NT-PROBNP SERPL-MCNC: 1415 PG/ML (ref 5–1800)
O2 A-A PPRESDIFF RESPIRATORY: 0.8 MMHG
PCO2 BLDA: 49.4 MM HG (ref 35–45)
PH BLDA: 7.33 PH UNITS (ref 7.35–7.45)
PH UR STRIP.AUTO: 6.5 [PH] (ref 5–8)
PHOSPHATE SERPL-MCNC: 2.6 MG/DL (ref 2.5–4.5)
PHOSPHATE SERPL-MCNC: 3.5 MG/DL (ref 2.5–4.5)
PLATELET # BLD AUTO: 149 10*3/MM3 (ref 140–450)
PLATELET # BLD AUTO: 161 10*3/MM3 (ref 140–450)
PLATELET # BLD AUTO: 196 10*3/MM3 (ref 140–450)
PMV BLD AUTO: 10.4 FL (ref 6–12)
PMV BLD AUTO: 11 FL (ref 6–12)
PMV BLD AUTO: 11.4 FL (ref 6–12)
PO2 BLDA: 76.7 MM HG (ref 80–100)
POTASSIUM BLD-SCNC: 3.8 MMOL/L (ref 3.5–5.2)
POTASSIUM BLD-SCNC: 3.8 MMOL/L (ref 3.5–5.2)
POTASSIUM BLD-SCNC: 4 MMOL/L (ref 3.5–5.2)
POTASSIUM BLD-SCNC: 4.1 MMOL/L (ref 3.5–5.2)
PROCALCITONIN SERPL-MCNC: 0.1 NG/ML (ref 0.1–0.25)
PROCALCITONIN SERPL-MCNC: 0.13 NG/ML (ref 0.1–0.25)
PROT SERPL-MCNC: 6.5 G/DL (ref 6–8.5)
PROT UR QL STRIP: NEGATIVE
RBC # BLD AUTO: 4.26 10*6/MM3 (ref 3.77–5.28)
RBC # BLD AUTO: 4.39 10*6/MM3 (ref 3.77–5.28)
RBC # BLD AUTO: 4.51 10*6/MM3 (ref 3.77–5.28)
RBC # UR: ABNORMAL /HPF
REF LAB TEST METHOD: ABNORMAL
RHINOVIRUS RNA SPEC NAA+PROBE: DETECTED
RSV RNA NPH QL NAA+NON-PROBE: NOT DETECTED
SAO2 % BLDCOA: 94 % (ref 92–99)
SET MECH RESP RATE: 18
SODIUM BLD-SCNC: 126 MMOL/L (ref 136–145)
SODIUM BLD-SCNC: 128 MMOL/L (ref 136–145)
SODIUM BLD-SCNC: 129 MMOL/L (ref 136–145)
SODIUM BLD-SCNC: 131 MMOL/L (ref 136–145)
SODIUM UR-SCNC: 77 MMOL/L
SP GR UR STRIP: 1.02 (ref 1–1.03)
SQUAMOUS #/AREA URNS HPF: ABNORMAL /HPF
TOTAL RATE: 18 BREATHS/MINUTE
TROPONIN T SERPL-MCNC: <0.01 NG/ML (ref 0–0.03)
UROBILINOGEN UR QL STRIP: ABNORMAL
WBC NRBC COR # BLD: 10.21 10*3/MM3 (ref 3.4–10.8)
WBC NRBC COR # BLD: 8.8 10*3/MM3 (ref 3.4–10.8)
WBC NRBC COR # BLD: 9.5 10*3/MM3 (ref 3.4–10.8)
WBC UR QL AUTO: ABNORMAL /HPF

## 2019-01-01 PROCEDURE — 82962 GLUCOSE BLOOD TEST: CPT

## 2019-01-01 PROCEDURE — 36600 WITHDRAWAL OF ARTERIAL BLOOD: CPT

## 2019-01-01 PROCEDURE — 94660 CPAP INITIATION&MGMT: CPT

## 2019-01-01 PROCEDURE — 94799 UNLISTED PULMONARY SVC/PX: CPT

## 2019-01-01 PROCEDURE — 84484 ASSAY OF TROPONIN QUANT: CPT | Performed by: EMERGENCY MEDICINE

## 2019-01-01 PROCEDURE — 83605 ASSAY OF LACTIC ACID: CPT | Performed by: HOSPITALIST

## 2019-01-01 PROCEDURE — 97161 PT EVAL LOW COMPLEX 20 MIN: CPT

## 2019-01-01 PROCEDURE — 63710000001 INSULIN LISPRO (HUMAN) PER 5 UNITS: Performed by: NURSE PRACTITIONER

## 2019-01-01 PROCEDURE — 83036 HEMOGLOBIN GLYCOSYLATED A1C: CPT | Performed by: NURSE PRACTITIONER

## 2019-01-01 PROCEDURE — 25010000002 HYDRALAZINE PER 20 MG: Performed by: INTERNAL MEDICINE

## 2019-01-01 PROCEDURE — 71045 X-RAY EXAM CHEST 1 VIEW: CPT

## 2019-01-01 PROCEDURE — 25010000002 HALOPERIDOL LACTATE PER 5 MG: Performed by: INTERNAL MEDICINE

## 2019-01-01 PROCEDURE — 25010000002 MORPHINE PER 10 MG: Performed by: INTERNAL MEDICINE

## 2019-01-01 PROCEDURE — 83735 ASSAY OF MAGNESIUM: CPT | Performed by: HOSPITALIST

## 2019-01-01 PROCEDURE — 71046 X-RAY EXAM CHEST 2 VIEWS: CPT

## 2019-01-01 PROCEDURE — 36415 COLL VENOUS BLD VENIPUNCTURE: CPT | Performed by: NURSE PRACTITIONER

## 2019-01-01 PROCEDURE — 93005 ELECTROCARDIOGRAM TRACING: CPT | Performed by: EMERGENCY MEDICINE

## 2019-01-01 PROCEDURE — 99284 EMERGENCY DEPT VISIT MOD MDM: CPT

## 2019-01-01 PROCEDURE — 82140 ASSAY OF AMMONIA: CPT | Performed by: HOSPITALIST

## 2019-01-01 PROCEDURE — 85027 COMPLETE CBC AUTOMATED: CPT | Performed by: NURSE PRACTITIONER

## 2019-01-01 PROCEDURE — 25010000002 HYDRALAZINE PER 20 MG: Performed by: NURSE PRACTITIONER

## 2019-01-01 PROCEDURE — 84145 PROCALCITONIN (PCT): CPT | Performed by: HOSPITALIST

## 2019-01-01 PROCEDURE — 84300 ASSAY OF URINE SODIUM: CPT | Performed by: HOSPITALIST

## 2019-01-01 PROCEDURE — 80053 COMPREHEN METABOLIC PANEL: CPT | Performed by: EMERGENCY MEDICINE

## 2019-01-01 PROCEDURE — 25010000002 LORAZEPAM PER 2 MG: Performed by: INTERNAL MEDICINE

## 2019-01-01 PROCEDURE — 25010000002 DEXAMETHASONE PER 1 MG: Performed by: INTERNAL MEDICINE

## 2019-01-01 PROCEDURE — 81001 URINALYSIS AUTO W/SCOPE: CPT | Performed by: EMERGENCY MEDICINE

## 2019-01-01 PROCEDURE — 82803 BLOOD GASES ANY COMBINATION: CPT

## 2019-01-01 PROCEDURE — 97110 THERAPEUTIC EXERCISES: CPT

## 2019-01-01 PROCEDURE — 83880 ASSAY OF NATRIURETIC PEPTIDE: CPT | Performed by: EMERGENCY MEDICINE

## 2019-01-01 PROCEDURE — 84145 PROCALCITONIN (PCT): CPT | Performed by: EMERGENCY MEDICINE

## 2019-01-01 PROCEDURE — 70450 CT HEAD/BRAIN W/O DYE: CPT

## 2019-01-01 PROCEDURE — 94640 AIRWAY INHALATION TREATMENT: CPT

## 2019-01-01 PROCEDURE — 80069 RENAL FUNCTION PANEL: CPT | Performed by: HOSPITALIST

## 2019-01-01 PROCEDURE — 63710000001 INSULIN LISPRO (HUMAN) PER 5 UNITS: Performed by: INTERNAL MEDICINE

## 2019-01-01 PROCEDURE — 0100U HC BIOFIRE FILMARRAY RESP PANEL 2: CPT | Performed by: EMERGENCY MEDICINE

## 2019-01-01 PROCEDURE — 85027 COMPLETE CBC AUTOMATED: CPT | Performed by: HOSPITALIST

## 2019-01-01 PROCEDURE — 93010 ELECTROCARDIOGRAM REPORT: CPT | Performed by: INTERNAL MEDICINE

## 2019-01-01 PROCEDURE — 85025 COMPLETE CBC W/AUTO DIFF WBC: CPT | Performed by: EMERGENCY MEDICINE

## 2019-01-01 PROCEDURE — 87086 URINE CULTURE/COLONY COUNT: CPT | Performed by: EMERGENCY MEDICINE

## 2019-01-01 RX ORDER — ACETAMINOPHEN 650 MG/1
650 SUPPOSITORY RECTAL EVERY 4 HOURS PRN
Status: DISCONTINUED | OUTPATIENT
Start: 2019-01-01 | End: 2019-09-04 | Stop reason: HOSPADM

## 2019-01-01 RX ORDER — ACETAMINOPHEN 325 MG/1
650 TABLET ORAL EVERY 4 HOURS PRN
Status: DISCONTINUED | OUTPATIENT
Start: 2019-01-01 | End: 2019-09-04 | Stop reason: HOSPADM

## 2019-01-01 RX ORDER — DOXYCYCLINE HYCLATE 100 MG
TABLET ORAL
Qty: 30 TABLET | Refills: 0 | OUTPATIENT
Start: 2019-01-01

## 2019-01-01 RX ORDER — ALPRAZOLAM 0.25 MG/1
0.25 TABLET ORAL NIGHTLY PRN
Status: DISCONTINUED | OUTPATIENT
Start: 2019-01-01 | End: 2019-01-01

## 2019-01-01 RX ORDER — GUAIFENESIN 600 MG/1
600 TABLET, EXTENDED RELEASE ORAL EVERY 12 HOURS SCHEDULED
Status: DISCONTINUED | OUTPATIENT
Start: 2019-01-01 | End: 2019-01-01

## 2019-01-01 RX ORDER — SODIUM CHLORIDE 0.9 % (FLUSH) 0.9 %
10 SYRINGE (ML) INJECTION AS NEEDED
Status: DISCONTINUED | OUTPATIENT
Start: 2019-01-01 | End: 2019-09-04 | Stop reason: HOSPADM

## 2019-01-01 RX ORDER — HALOPERIDOL 5 MG/ML
2 INJECTION INTRAMUSCULAR ONCE
Status: COMPLETED | OUTPATIENT
Start: 2019-01-01 | End: 2019-01-01

## 2019-01-01 RX ORDER — MORPHINE SULFATE 2 MG/ML
2 INJECTION, SOLUTION INTRAMUSCULAR; INTRAVENOUS
Status: DISCONTINUED | OUTPATIENT
Start: 2019-01-01 | End: 2019-09-04 | Stop reason: HOSPADM

## 2019-01-01 RX ORDER — AMLODIPINE BESYLATE 5 MG/1
5 TABLET ORAL
Status: DISCONTINUED | OUTPATIENT
Start: 2019-01-01 | End: 2019-01-01

## 2019-01-01 RX ORDER — ALBUTEROL SULFATE 2.5 MG/3ML
2.5 SOLUTION RESPIRATORY (INHALATION) EVERY 6 HOURS PRN
Status: DISCONTINUED | OUTPATIENT
Start: 2019-01-01 | End: 2019-01-01

## 2019-01-01 RX ORDER — LORAZEPAM 2 MG/ML
2 INJECTION INTRAMUSCULAR
Status: DISCONTINUED | OUTPATIENT
Start: 2019-01-01 | End: 2019-09-04 | Stop reason: HOSPADM

## 2019-01-01 RX ORDER — LABETALOL HYDROCHLORIDE 5 MG/ML
20 INJECTION, SOLUTION INTRAVENOUS ONCE
Status: COMPLETED | OUTPATIENT
Start: 2019-01-01 | End: 2019-01-01

## 2019-01-01 RX ORDER — ALBUTEROL SULFATE 2.5 MG/3ML
2.5 SOLUTION RESPIRATORY (INHALATION)
Status: DISCONTINUED | OUTPATIENT
Start: 2019-01-01 | End: 2019-01-01

## 2019-01-01 RX ORDER — DIPHENOXYLATE HYDROCHLORIDE AND ATROPINE SULFATE 2.5; .025 MG/1; MG/1
1 TABLET ORAL
Status: DISCONTINUED | OUTPATIENT
Start: 2019-01-01 | End: 2019-09-04 | Stop reason: HOSPADM

## 2019-01-01 RX ORDER — MORPHINE SULFATE 20 MG/ML
20 SOLUTION ORAL
Status: DISCONTINUED | OUTPATIENT
Start: 2019-01-01 | End: 2019-09-04 | Stop reason: HOSPADM

## 2019-01-01 RX ORDER — MORPHINE SULFATE 10 MG/ML
6 INJECTION INTRAMUSCULAR; INTRAVENOUS; SUBCUTANEOUS
Status: DISCONTINUED | OUTPATIENT
Start: 2019-01-01 | End: 2019-09-04 | Stop reason: HOSPADM

## 2019-01-01 RX ORDER — HALOPERIDOL 2 MG/ML
1 SOLUTION ORAL EVERY 4 HOURS PRN
Status: DISCONTINUED | OUTPATIENT
Start: 2019-01-01 | End: 2019-09-04 | Stop reason: HOSPADM

## 2019-01-01 RX ORDER — GLYCOPYRROLATE 0.2 MG/ML
0.4 INJECTION INTRAMUSCULAR; INTRAVENOUS
Status: DISCONTINUED | OUTPATIENT
Start: 2019-01-01 | End: 2019-09-04 | Stop reason: HOSPADM

## 2019-01-01 RX ORDER — HALOPERIDOL 2 MG/ML
2 SOLUTION ORAL EVERY 4 HOURS PRN
Status: DISCONTINUED | OUTPATIENT
Start: 2019-01-01 | End: 2019-09-04 | Stop reason: HOSPADM

## 2019-01-01 RX ORDER — LORAZEPAM 2 MG/ML
0.5 CONCENTRATE ORAL
Status: DISCONTINUED | OUTPATIENT
Start: 2019-01-01 | End: 2019-09-04 | Stop reason: HOSPADM

## 2019-01-01 RX ORDER — HALOPERIDOL 5 MG/ML
1 INJECTION INTRAMUSCULAR EVERY 4 HOURS PRN
Status: DISCONTINUED | OUTPATIENT
Start: 2019-01-01 | End: 2019-09-04 | Stop reason: HOSPADM

## 2019-01-01 RX ORDER — LABETALOL HYDROCHLORIDE 5 MG/ML
10 INJECTION, SOLUTION INTRAVENOUS ONCE
Status: DISCONTINUED | OUTPATIENT
Start: 2019-01-01 | End: 2019-01-01

## 2019-01-01 RX ORDER — DOXYCYCLINE HYCLATE 100 MG
TABLET ORAL
Qty: 30 TABLET | Refills: 0 | Status: SHIPPED | OUTPATIENT
Start: 2019-01-01

## 2019-01-01 RX ORDER — LORAZEPAM 2 MG/ML
0.5 INJECTION INTRAMUSCULAR
Status: DISCONTINUED | OUTPATIENT
Start: 2019-01-01 | End: 2019-09-04 | Stop reason: HOSPADM

## 2019-01-01 RX ORDER — ACETAMINOPHEN 160 MG/5ML
650 SOLUTION ORAL EVERY 4 HOURS PRN
Status: DISCONTINUED | OUTPATIENT
Start: 2019-01-01 | End: 2019-09-04 | Stop reason: HOSPADM

## 2019-01-01 RX ORDER — CARVEDILOL 6.25 MG/1
6.25 TABLET ORAL 2 TIMES DAILY
Status: DISCONTINUED | OUTPATIENT
Start: 2019-01-01 | End: 2019-01-01

## 2019-01-01 RX ORDER — DEXTROSE MONOHYDRATE 25 G/50ML
25 INJECTION, SOLUTION INTRAVENOUS
Status: DISCONTINUED | OUTPATIENT
Start: 2019-01-01 | End: 2019-09-04 | Stop reason: HOSPADM

## 2019-01-01 RX ORDER — MORPHINE SULFATE 20 MG/ML
10 SOLUTION ORAL
Status: DISCONTINUED | OUTPATIENT
Start: 2019-01-01 | End: 2019-09-04 | Stop reason: HOSPADM

## 2019-01-01 RX ORDER — HYDRALAZINE HYDROCHLORIDE 20 MG/ML
20 INJECTION INTRAMUSCULAR; INTRAVENOUS ONCE
Status: COMPLETED | OUTPATIENT
Start: 2019-01-01 | End: 2019-01-01

## 2019-01-01 RX ORDER — LABETALOL HYDROCHLORIDE 5 MG/ML
10 INJECTION, SOLUTION INTRAVENOUS ONCE
Status: COMPLETED | OUTPATIENT
Start: 2019-01-01 | End: 2019-01-01

## 2019-01-01 RX ORDER — LORAZEPAM 2 MG/ML
2 CONCENTRATE ORAL
Status: DISCONTINUED | OUTPATIENT
Start: 2019-01-01 | End: 2019-09-04 | Stop reason: HOSPADM

## 2019-01-01 RX ORDER — ONDANSETRON 2 MG/ML
4 INJECTION INTRAMUSCULAR; INTRAVENOUS EVERY 6 HOURS PRN
Status: DISCONTINUED | OUTPATIENT
Start: 2019-01-01 | End: 2019-09-04 | Stop reason: HOSPADM

## 2019-01-01 RX ORDER — HALOPERIDOL 5 MG/ML
2 INJECTION INTRAMUSCULAR EVERY 4 HOURS PRN
Status: DISCONTINUED | OUTPATIENT
Start: 2019-01-01 | End: 2019-09-04 | Stop reason: HOSPADM

## 2019-01-01 RX ORDER — HALOPERIDOL 1 MG/1
1 TABLET ORAL EVERY 4 HOURS PRN
Status: DISCONTINUED | OUTPATIENT
Start: 2019-01-01 | End: 2019-09-04 | Stop reason: HOSPADM

## 2019-01-01 RX ORDER — LOSARTAN POTASSIUM 50 MG/1
50 TABLET ORAL DAILY
Status: DISCONTINUED | OUTPATIENT
Start: 2019-01-01 | End: 2019-01-01

## 2019-01-01 RX ORDER — SCOLOPAMINE TRANSDERMAL SYSTEM 1 MG/1
1 PATCH, EXTENDED RELEASE TRANSDERMAL
Status: DISCONTINUED | OUTPATIENT
Start: 2019-01-01 | End: 2019-09-04 | Stop reason: HOSPADM

## 2019-01-01 RX ORDER — DEXAMETHASONE SODIUM PHOSPHATE 10 MG/ML
5 INJECTION INTRAMUSCULAR; INTRAVENOUS EVERY 6 HOURS
Status: DISCONTINUED | OUTPATIENT
Start: 2019-01-01 | End: 2019-01-01

## 2019-01-01 RX ORDER — SODIUM CHLORIDE, SODIUM LACTATE, POTASSIUM CHLORIDE, CALCIUM CHLORIDE 600; 310; 30; 20 MG/100ML; MG/100ML; MG/100ML; MG/100ML
75 INJECTION, SOLUTION INTRAVENOUS CONTINUOUS
Status: ACTIVE | OUTPATIENT
Start: 2019-01-01 | End: 2019-01-01

## 2019-01-01 RX ORDER — HYDROMORPHONE HYDROCHLORIDE 1 MG/ML
0.5 INJECTION, SOLUTION INTRAMUSCULAR; INTRAVENOUS; SUBCUTANEOUS
Status: DISCONTINUED | OUTPATIENT
Start: 2019-01-01 | End: 2019-09-04 | Stop reason: HOSPADM

## 2019-01-01 RX ORDER — CARVEDILOL 6.25 MG/1
TABLET ORAL
Qty: 60 TABLET | Refills: 0 | Status: SHIPPED | OUTPATIENT
Start: 2019-01-01

## 2019-01-01 RX ORDER — NICOTINE POLACRILEX 4 MG
15 LOZENGE BUCCAL
Status: DISCONTINUED | OUTPATIENT
Start: 2019-01-01 | End: 2019-09-04 | Stop reason: HOSPADM

## 2019-01-01 RX ORDER — LORAZEPAM 2 MG/ML
1 INJECTION INTRAMUSCULAR
Status: DISCONTINUED | OUTPATIENT
Start: 2019-01-01 | End: 2019-09-04 | Stop reason: HOSPADM

## 2019-01-01 RX ORDER — MORPHINE SULFATE 2 MG/ML
4 INJECTION, SOLUTION INTRAMUSCULAR; INTRAVENOUS
Status: DISCONTINUED | OUTPATIENT
Start: 2019-01-01 | End: 2019-09-04 | Stop reason: HOSPADM

## 2019-01-01 RX ORDER — LORAZEPAM 2 MG/ML
1 CONCENTRATE ORAL
Status: DISCONTINUED | OUTPATIENT
Start: 2019-01-01 | End: 2019-09-04 | Stop reason: HOSPADM

## 2019-01-01 RX ORDER — HYDRALAZINE HYDROCHLORIDE 20 MG/ML
10 INJECTION INTRAMUSCULAR; INTRAVENOUS EVERY 6 HOURS PRN
Status: DISCONTINUED | OUTPATIENT
Start: 2019-01-01 | End: 2019-09-04 | Stop reason: HOSPADM

## 2019-01-01 RX ORDER — SODIUM CHLORIDE 0.9 % (FLUSH) 0.9 %
10 SYRINGE (ML) INJECTION EVERY 12 HOURS SCHEDULED
Status: DISCONTINUED | OUTPATIENT
Start: 2019-01-01 | End: 2019-09-04 | Stop reason: HOSPADM

## 2019-01-01 RX ORDER — MORPHINE SULFATE 20 MG/ML
5 SOLUTION ORAL
Status: DISCONTINUED | OUTPATIENT
Start: 2019-01-01 | End: 2019-09-04 | Stop reason: HOSPADM

## 2019-01-01 RX ORDER — GLYCOPYRROLATE 0.2 MG/ML
0.2 INJECTION INTRAMUSCULAR; INTRAVENOUS
Status: DISCONTINUED | OUTPATIENT
Start: 2019-01-01 | End: 2019-09-04 | Stop reason: HOSPADM

## 2019-01-01 RX ORDER — HALOPERIDOL 1 MG/1
2 TABLET ORAL EVERY 4 HOURS PRN
Status: DISCONTINUED | OUTPATIENT
Start: 2019-01-01 | End: 2019-09-04 | Stop reason: HOSPADM

## 2019-01-01 RX ADMIN — SCOPALAMINE 1 PATCH: 1 PATCH, EXTENDED RELEASE TRANSDERMAL at 22:25

## 2019-01-01 RX ADMIN — GUAIFENESIN 600 MG: 600 TABLET, EXTENDED RELEASE ORAL at 21:09

## 2019-01-01 RX ADMIN — GLYCOPYRROLATE 0.4 MG: 0.2 INJECTION, SOLUTION INTRAMUSCULAR; INTRAVENOUS at 14:44

## 2019-01-01 RX ADMIN — GUAIFENESIN 600 MG: 600 TABLET, EXTENDED RELEASE ORAL at 08:29

## 2019-01-01 RX ADMIN — CARVEDILOL 6.25 MG: 6.25 TABLET, FILM COATED ORAL at 08:29

## 2019-01-01 RX ADMIN — HYDRALAZINE HYDROCHLORIDE 10 MG: 20 INJECTION INTRAMUSCULAR; INTRAVENOUS at 02:03

## 2019-01-01 RX ADMIN — SODIUM CHLORIDE, PRESERVATIVE FREE 10 ML: 5 INJECTION INTRAVENOUS at 08:25

## 2019-01-01 RX ADMIN — ALBUTEROL SULFATE 2.5 MG: 2.5 SOLUTION RESPIRATORY (INHALATION) at 15:36

## 2019-01-01 RX ADMIN — MORPHINE SULFATE 2 MG: 2 INJECTION, SOLUTION INTRAMUSCULAR; INTRAVENOUS at 20:28

## 2019-01-01 RX ADMIN — ALBUTEROL SULFATE 2.5 MG: 2.5 SOLUTION RESPIRATORY (INHALATION) at 20:30

## 2019-01-01 RX ADMIN — LOSARTAN POTASSIUM 50 MG: 50 TABLET, FILM COATED ORAL at 08:29

## 2019-01-01 RX ADMIN — INSULIN LISPRO 2 UNITS: 100 INJECTION, SOLUTION INTRAVENOUS; SUBCUTANEOUS at 20:20

## 2019-01-01 RX ADMIN — ALBUTEROL SULFATE 2.5 MG: 2.5 SOLUTION RESPIRATORY (INHALATION) at 04:30

## 2019-01-01 RX ADMIN — GLYCOPYRROLATE 0.4 MG: 0.2 INJECTION, SOLUTION INTRAMUSCULAR; INTRAVENOUS at 21:18

## 2019-01-01 RX ADMIN — ALPRAZOLAM 0.25 MG: 0.25 TABLET ORAL at 23:02

## 2019-01-01 RX ADMIN — INSULIN LISPRO 2 UNITS: 100 INJECTION, SOLUTION INTRAVENOUS; SUBCUTANEOUS at 12:07

## 2019-01-01 RX ADMIN — HYDRALAZINE HYDROCHLORIDE 20 MG: 20 INJECTION INTRAMUSCULAR; INTRAVENOUS at 07:11

## 2019-01-01 RX ADMIN — DEXAMETHASONE SODIUM PHOSPHATE 5 MG: 10 INJECTION INTRAMUSCULAR; INTRAVENOUS at 08:24

## 2019-01-01 RX ADMIN — HYDRALAZINE HYDROCHLORIDE 10 MG: 20 INJECTION INTRAMUSCULAR; INTRAVENOUS at 04:45

## 2019-01-01 RX ADMIN — LABETALOL 20 MG/4 ML (5 MG/ML) INTRAVENOUS SYRINGE 10 MG: at 01:38

## 2019-01-01 RX ADMIN — ALBUTEROL SULFATE 2.5 MG: 2.5 SOLUTION RESPIRATORY (INHALATION) at 23:30

## 2019-01-01 RX ADMIN — HYDRALAZINE HYDROCHLORIDE 10 MG: 20 INJECTION INTRAMUSCULAR; INTRAVENOUS at 11:40

## 2019-01-01 RX ADMIN — INSULIN LISPRO 3 UNITS: 100 INJECTION, SOLUTION INTRAVENOUS; SUBCUTANEOUS at 09:54

## 2019-01-01 RX ADMIN — CARVEDILOL 6.25 MG: 6.25 TABLET, FILM COATED ORAL at 21:09

## 2019-01-01 RX ADMIN — GUAIFENESIN 600 MG: 600 TABLET, EXTENDED RELEASE ORAL at 20:20

## 2019-01-01 RX ADMIN — SODIUM CHLORIDE, PRESERVATIVE FREE 10 ML: 5 INJECTION INTRAVENOUS at 21:10

## 2019-01-01 RX ADMIN — CARVEDILOL 6.25 MG: 6.25 TABLET, FILM COATED ORAL at 20:20

## 2019-01-01 RX ADMIN — HALOPERIDOL LACTATE 2 MG: 5 INJECTION INTRAMUSCULAR at 05:30

## 2019-01-01 RX ADMIN — SODIUM CHLORIDE, PRESERVATIVE FREE 10 ML: 5 INJECTION INTRAVENOUS at 20:26

## 2019-01-01 RX ADMIN — RACEPINEPHRINE HYDROCHLORIDE 0.5 ML: 11.25 SOLUTION RESPIRATORY (INHALATION) at 05:34

## 2019-01-01 RX ADMIN — SODIUM CHLORIDE, POTASSIUM CHLORIDE, SODIUM LACTATE AND CALCIUM CHLORIDE 75 ML/HR: 600; 310; 30; 20 INJECTION, SOLUTION INTRAVENOUS at 17:42

## 2019-01-01 RX ADMIN — GLYCOPYRROLATE 0.4 MG: 0.2 INJECTION, SOLUTION INTRAMUSCULAR; INTRAVENOUS at 19:19

## 2019-01-01 RX ADMIN — LABETALOL 20 MG/4 ML (5 MG/ML) INTRAVENOUS SYRINGE 20 MG: at 02:48

## 2019-01-01 RX ADMIN — LORAZEPAM 2 MG: 2 INJECTION INTRAMUSCULAR; INTRAVENOUS at 20:28

## 2019-01-01 RX ADMIN — SODIUM CHLORIDE, PRESERVATIVE FREE 10 ML: 5 INJECTION INTRAVENOUS at 20:20

## 2019-01-01 RX ADMIN — LORAZEPAM 2 MG: 2 INJECTION INTRAMUSCULAR; INTRAVENOUS at 14:23

## 2019-01-01 RX ADMIN — LOSARTAN POTASSIUM 50 MG: 50 TABLET, FILM COATED ORAL at 17:42

## 2019-01-01 RX ADMIN — MORPHINE SULFATE 2 MG: 2 INJECTION, SOLUTION INTRAMUSCULAR; INTRAVENOUS at 14:44

## 2019-01-01 RX ADMIN — ALBUTEROL SULFATE 2.5 MG: 2.5 SOLUTION RESPIRATORY (INHALATION) at 07:00

## 2019-03-21 NOTE — TELEPHONE ENCOUNTER
Please be advised that patient has another primary dr and will no longer be apart of this practice.

## 2019-03-21 NOTE — TELEPHONE ENCOUNTER
Please call patient and let her know that I will give her 1 month of her medication.   Her last visit in the office was almost 1 year ago and she needs to be established with a new provider in the office since Dr. Galvan is no longer here.

## 2019-09-01 PROBLEM — I10 UNCONTROLLED HYPERTENSION: Status: ACTIVE | Noted: 2019-01-01

## 2019-09-01 NOTE — CONSULTS
"Adult Nutrition  Assessment/PES    Patient Name:  Etelvina Bales  YOB: 1933  MRN: [id number]  Admit Date:  9/1/2019    Assessment Date:  9/1/2019    Comments:  Consult per nurse admission screen: reduced oral intake.     Pt not eating/drinking well over past week due to URI. Tolerating diet here, ate 75% of breakfast tray. Added Boost supplements BID. Noted that Aic 4.3, which is quite low for 84 yo with diabetes. Takes Amaryl at home. Will continue to monitor PO & BG levels while here.    Reason for Assessment     Row Name 09/01/19 1056          Reason for Assessment    Reason For Assessment  nurse/nurse practitioner consult     Diagnosis  cardiac disease;diabetes diagnosis/complications;renal disease uncontrolled HTN + URI; hx DM2, CKD.      Identified At Risk by Screening Criteria  MST SCORE 2+;reduced oral intake over the last month         Nutrition/Diet History     Row Name 09/01/19 1059          Nutrition/Diet History    Typical Food/Fluid Intake  Intake has been poor over past week or so due to a bad upper respiratory infection (cold). Not eating or drinking her norm. +visual hallucinations noted.          Anthropometrics     Row Name 09/01/19 1101 09/01/19 0428       Anthropometrics    Height  --  157.5 cm (62\")    Weight  --  54.6 kg (120 lb 6.4 oz)       Admit Weight    Admit Weight Method  measured  --    Admit Weight  54.4 kg (120 lb) standing scale (bedscale = 125 lb)  --       Ideal Body Weight (IBW)    Ideal Body Weight (IBW) (kg)  --  50.43    % Ideal Body Weight  --  108.29       Usual Body Weight (UBW)    Weight Loss  other (see comments) gradual weight loss trend over past few years - used to weigh ~ 150-160#, was 144 lb in April 2018  --       Body Mass Index (BMI)    BMI (kg/m2)  --  22.07    BMI Assessment  BMI 18.5-24.9: normal  --        Labs/Tests/Procedures/Meds     Row Name 09/01/19 1101          Labs/Procedures/Meds    Lab Results Reviewed  reviewed     Lab Results " "Comments  Na 129, Glu, Aic 4.5; RVP = +rhinovirus        Diagnostic Tests/Procedures    Diagnostic Test/Procedure Reviewed  reviewed     Diagnostic Test/Procedures Comments  CT head wnl, CXR wnl        Medications    Pertinent Medications Reviewed  reviewed     Pertinent Medications Comments  insulin, NaCl @ 10 c/hr         Physical Findings     Row Name 09/01/19 1104          Physical Findings    Skin  other (see comments) skin intact         Estimated/Assessed Needs     Row Name 09/01/19 1108 09/01/19 0428       Calculation Measurements    Weight Used For Calculations  54.6 kg (120 lb 5.9 oz)  --    Height  --  157.5 cm (62\")       Estimated/Assessed Needs    Additional Documentation  Protein Requirements (Group);Fluid Requirements (Group);KCAL/KG (Group)  --       KCAL/KG    KCAL/KG  25 Kcal/Kg (kcal);30 Kcal/Kg (kcal)  --    25 Kcal/Kg (kcal)  1365  --    30 Kcal/Kg (kcal)  1638  --       Protein Requirements    Est Protein Requirement Amount (gms/kg)  1.0 gm protein  --    Estimated Protein Requirements (gms/day)  54.6  --       Fluid Requirements    Estimated Fluid Requirements (mL/day)  1638  --    Estimated Fluid Requirement Method  RDA Method  --    RDA Method (mL)  1638  --       --        Nutrition Prescription Ordered     Row Name 09/01/19 1109          Nutrition Prescription PO    Current PO Diet  Regular     Fluid Consistency  Thin     Common Modifiers  Cardiac;Consistent Carbohydrate         Evaluation of Received Nutrient/Fluid Intake     Row Name 09/01/19 1110          PO Evaluation    Number of Meals  1     % PO Intake  75% breakfast               Problem/Interventions:  Problem 1     Row Name 09/01/19 1111          Nutrition Diagnoses Problem 1    Problem 1  Unintended Weight Loss     Signs/Symptoms (evidenced by)  Unintended Weight Change     Unintended Weight Change  Loss     Number of Pounds Lost  20-25 lb over past 1 1/2 years                 Intervention Goal     Row Name 09/01/19 1112       "    Intervention Goal    General  Maintain nutrition;Meet nutritional needs for age/condition;Disease management/therapy     PO  Tolerate PO;Maintain intake;PO intake (%)     PO Intake %  75 %     Weight  Appropriate weight gain         Nutrition Intervention     Row Name 09/01/19 1112          Nutrition Intervention    RD/Tech Action  Interview for preference;Encourage intake;Follow Tx progress;Care plan reviewd;Recommend/ordered     Recommended/Ordered  Supplement         Nutrition Prescription     Row Name 09/01/19 1112          Nutrition Prescription PO    PO Prescription  Begin/change supplement     Supplement  Boost Glucose Control     Supplement Frequency  2 times a day     New PO Prescription Ordered?  Yes         Education/Evaluation     Row Name 09/01/19 1113          Education    Education  Will Instruct as appropriate        Monitor/Evaluation    Monitor  Per protocol           Electronically signed by:  Alanna Mccormick RD  09/01/19 11:14 AM

## 2019-09-01 NOTE — PLAN OF CARE
Problem: Patient Care Overview  Goal: Plan of Care Review  Outcome: Ongoing (interventions implemented as appropriate)   09/01/19 0646 09/01/19 1420 09/01/19 1504   Coping/Psychosocial   Plan of Care Reviewed With --  patient;daughter --    Plan of Care Review   Progress no change --  --    OTHER   Outcome Summary --  --  VSS, BP still elevated but improved from overnight readings, pt positive for rhinovirus, resting comfortably, will continue to closely monitor.     Goal: Individualization and Mutuality  Outcome: Ongoing (interventions implemented as appropriate)    Goal: Discharge Needs Assessment  Outcome: Ongoing (interventions implemented as appropriate)    Goal: Interprofessional Rounds/Family Conf  Outcome: Ongoing (interventions implemented as appropriate)      Problem: Fall Risk (Adult)  Goal: Identify Related Risk Factors and Signs and Symptoms  Outcome: Ongoing (interventions implemented as appropriate)    Goal: Absence of Fall  Outcome: Ongoing (interventions implemented as appropriate)      Problem: Anxiety (Adult)  Goal: Identify Related Risk Factors and Signs and Symptoms  Outcome: Ongoing (interventions implemented as appropriate)    Goal: Reduction/Resolution  Outcome: Ongoing (interventions implemented as appropriate)      Problem: Infection, Risk/Actual (Adult)  Goal: Identify Related Risk Factors and Signs and Symptoms  Outcome: Ongoing (interventions implemented as appropriate)    Goal: Infection Prevention/Resolution  Outcome: Ongoing (interventions implemented as appropriate)

## 2019-09-01 NOTE — H&P
Name: Etelvina Bales ADMIT: 2019   : 1933  PCP: Simon Bay MD    MRN: [id number] LOS: 0 days   AGE/SEX: 85 y.o. female  ROOM: [facility]/     Chief Complaint   Patient presents with   • Cough   • Hallucinations       Subjective   Patient is a 85 y.o. female who presents to [facility] with the above chief complaint.  She is been feeling bad for about a week.  Symptoms started a shortness of breath cough weakness and fatigue.  She is been coughing a lot and has been increasingly congested.  She is tried multiple over-the-counter remedies without much improvement.  She has not been sleeping well at night and is just been feeling terrible.  2 days ago friends called and asked the patient's daughter to go check on the patient because they felt she was confused.  The daughter went to the house and the patient was acting normally at that time.  Then yesterday she received multiple phone calls again that her mom was acting weird.  When she got to the house her mom was saying that there was people inside the house who did clean the carpets and left words written in the carpet.  She had said something about people with big carly is coming to the house and trying to get her to do things.  Given the hallucinations and confusion she was brought to the emergency room last night.  She denies any fevers or chills has had a productive cough with clear sputum.  Increasing shortness of breath especially with exertion.      History of Present Illness    Past Medical History:   Diagnosis Date   • Cardiomyopathy (CMS/HCC)    • CHF (congestive heart failure) (CMS/HCC)    • Chronic kidney disease    • Diabetes mellitus (CMS/HCC)    • Heart murmur    • Hyperlipidemia    • Hypertension    • Hypokalemia    • Rosacea      Past Surgical History:   Procedure Laterality Date   • ABDOMINAL SURGERY     • APPENDECTOMY     • CATARACT EXTRACTION     • CHOLECYSTECTOMY     • COLONOSCOPY     • HYSTERECTOMY        Family History   Problem Relation Age of Onset   • Heart disease Mother    • Stroke Mother    • Early death Father    • Heart disease Father    • Diabetes Sister    • Hypertension Sister    • Kidney disease Sister    • Cancer Brother    • Hypertension Brother    • Asthma Son    • Bleeding Disorder Son    • Early death Son      Social History     Tobacco Use   • Smoking status: Never Smoker   • Smokeless tobacco: Never Used   Substance Use Topics   • Alcohol use: No   • Drug use: No     Medications Prior to Admission   Medication Sig Dispense Refill Last Dose   • Blood Glucose Monitoring Suppl (ONE TOUCH ULTRA 2) W/DEVICE kit    Taking   • carvedilol (COREG) 6.25 MG tablet TAKE ONE TABLET BY MOUTH TWICE A DAY 60 tablet 0    • doxycycline (VIBRAMYICN) 100 MG tablet TAKE ONE TABLET BY MOUTH DAILY 30 tablet 0    • glimepiride (AMARYL) 2 MG tablet Take 1 tablet by mouth 2 (Two) Times a Day. (Patient taking differently: Take 2 mg by mouth Daily.) 180 tablet 0    • glucose blood test strip OneTouch Ultra Blue In Vitro Strip; Patient Sig: OneTouch Ultra Blue In Vitro Strip TEST BLOOD SUGAR DAILY; 50; 0; 17-Dec-2013; Active   Taking   • hydrochlorothiazide (MICROZIDE) 12.5 MG capsule Take 1 capsule by mouth Daily. 90 capsule 1    • losartan (COZAAR) 50 MG tablet Take 1 tablet by mouth Daily. 90 tablet 0    • nitroglycerin (NITROSTAT) 0.4 MG SL tablet Place  under the tongue.   Taking   • ONE TOUCH ULTRA TEST test strip USE TO TEST DAILY 100 each 4 Taking   • ONETOUCH DELICA LANCETS 33G misc    Taking   • montelukast (SINGULAIR) 10 MG tablet Take 1 tablet by mouth Every Night. 30 tablet 2      Allergies:  Ace inhibitors; Codeine; and Lisinopril    Review of Systems   Unable to perform ROS: Mental status change        Objective    Vital Signs  Temp:  [97.6 °F (36.4 °C)-97.7 °F (36.5 °C)] 97.7 °F (36.5 °C)  Heart Rate:  [68-91] 79  Resp:  [16-22] 16  BP: (175-222)/() 197/80  SpO2:  [93 %-97 %] 96 %  on   ;   Device  (Oxygen Therapy): room air  Body mass index is 22.02 kg/m².    Physical Exam   Constitutional: She appears well-developed and well-nourished.   HENT:   Head: Normocephalic and atraumatic.   Cardiovascular: Normal rate, regular rhythm and normal heart sounds.   Pulmonary/Chest: Effort normal. She has wheezes. She has no rales.   Abdominal: Soft. Bowel sounds are normal.   Neurological: She is alert.   Oriented only to self   Skin: Skin is warm. Capillary refill takes less than 2 seconds.   Psychiatric:   Confused presently   Nursing note and vitals reviewed.      Results Review:   I reviewed the patient's new clinical results.  Results from last 7 days   Lab Units 09/01/19  0531 09/01/19  0137   WBC 10*3/mm3 9.50 10.21   HEMOGLOBIN g/dL 13.3 13.4   PLATELETS 10*3/mm3 161 149     Results from last 7 days   Lab Units 09/01/19  0531 09/01/19  0137   SODIUM mmol/L 129* 131*   POTASSIUM mmol/L 3.8 3.8   CHLORIDE mmol/L 94* 94*   CO2 mmol/L 24.1 26.8   BUN mg/dL 15 16   CREATININE mg/dL 0.95 1.01*   GLUCOSE mg/dL 96 96   ALBUMIN g/dL  --  4.10   BILIRUBIN mg/dL  --  0.6   ALK PHOS U/L  --  95   AST (SGOT) U/L  --  18   ALT (SGPT) U/L  --  11   Estimated Creatinine Clearance: 37.3 mL/min (by C-G formula based on SCr of 0.95 mg/dL).  Results from last 7 days   Lab Units 09/01/19  0137   TROPONIN T ng/mL <0.010   PROBNP pg/mL 1,415.0         Invalid input(s): LDLCALC  Results from last 7 days   Lab Units 09/01/19  0104   NITRITE UA  Negative   WBC UA /HPF 21-30*   BACTERIA UA /HPF None Seen   SQUAM EPITHEL UA /HPF 3-6*     CT Head Without Contrast   Final Result   1. No acute intracranial abnormality.                       This report was finalized on 9/1/2019 2:00 AM by Arnoldo Mueller M.D.          XR Chest 2 View   Final Result   1. No active disease.       This report was finalized on 9/1/2019 1:59 AM by Arnoldo Ervin, M.D.            Assessment/Plan       Uncontrolled hypertension      Assessment &  Plan  Rhinovirus-continue supportive care for the viral upper respiratory infection.  This is been part playing into her overall resenting symptoms.  Metabolic encephalopathy-secondary to the viral infection continue supportive care  Toxic encephalopathy to medications.  She had dextromethorphan which can sometimes cause problems with chronic kidney disease and can cause hallucinations.  We will watch off this medication.  HTN (accelerated)-blood pressure was significantly elevated on admission after couple doses of labetalol IV in the emergency room her blood pressures better this morning.  Will resume her home medications except for the hydrochlorothiazide  Chronic hyponatremia-sodium is dropped a little bit overnight she has chronic hyponatremia continue to monitor  Dementia vs delerium-she likely has some mild dementia but also has delirium at play.  She has not slept well in over a week and now her environment is changed here.  She is also been on multiple new medications which would all be playing into her current presentation  DM2 controlled with CKD: Monitor with sliding scale insulin check A1c  CKD: Renal function stable  Chronic diastolic HF: No signs of volume overload presently.  Ongoing hydrate gently overnight tonight and stop fluids in the morning      I discussed the patients findings and my recommendations with patient, family and nursing staff.      Oscar Holman MD  [facility]  09/01/19  6:45 AM

## 2019-09-01 NOTE — ED TRIAGE NOTES
"Pt to ED from home with reports of \"terrible cold\", pt started taking claritin and developed hallucinations tonight.    "

## 2019-09-01 NOTE — ED PROVIDER NOTES
" EMERGENCY DEPARTMENT ENCOUNTER    Room Number:  19/19  Date of encounter:  9/1/2019  PCP: Simon Bay MD  Historian: Patient and family      HPI:  Chief Complaint: Hallucinations and cough  A complete HPI/ROS/PMH/PSH/SH/FH are limited due to: Nothing    Context: Etelvina Bales is a 85 y.o. female who presents to the ED c/o \"terrible cold\" the last week.  She has not been eating and drinking well.  She has a cough productive of some thick sputum and some sinus drainage.  She has not had a fever.  She has tried Mucinex and Robitussin without relief.  She just started taking some Claritin as well taking may be it was allergy related.  She denies vomiting or diarrhea.  Denies any chest pain or back pain.  She does report in the last 24 hours she has begun seeing people and things that others say are not there.  For example, she reports seeing some people in her house cleaning carpets earlier today as well as seeing some people in the yard that the neighbor did not see.  She does not have any auditory hallucinations.  She does have a history of frequent UTIs but does not have any UTI symptoms.        PAST MEDICAL HISTORY  Active Ambulatory Problems     Diagnosis Date Noted   • Atopic rhinitis 04/01/2016   • Chronic kidney disease, stage III (moderate) (CMS/HCC) 04/01/2016   • Essential hypertension 04/01/2016   • Heart murmur 04/01/2016   • Hyperkalemia 04/01/2016   • Hyperlipidemia 04/01/2016   • Rosacea 04/01/2016   • Diabetes mellitus type II, non insulin dependent (CMS/HCC) 04/01/2016   • Health care maintenance 10/27/2017     Resolved Ambulatory Problems     Diagnosis Date Noted   • Diabetes mellitus (CMS/HCC) 04/01/2016   • Hypertension 04/01/2016     Past Medical History:   Diagnosis Date   • Cardiomyopathy (CMS/HCC)    • Chronic kidney disease    • Diabetes mellitus (CMS/HCC)    • Heart murmur    • Hyperlipidemia    • Hypertension    • Hypokalemia    • Rosacea          PAST SURGICAL HISTORY  Past " Surgical History:   Procedure Laterality Date   • APPENDECTOMY     • CATARACT EXTRACTION     • CHOLECYSTECTOMY     • COLONOSCOPY     • HYSTERECTOMY           FAMILY HISTORY  Family History   Problem Relation Age of Onset   • Heart disease Mother    • Stroke Father          SOCIAL HISTORY  Social History     Socioeconomic History   • Marital status:      Spouse name: Not on file   • Number of children: Not on file   • Years of education: Not on file   • Highest education level: Not on file   Tobacco Use   • Smoking status: Never Smoker   • Smokeless tobacco: Never Used   Substance and Sexual Activity   • Alcohol use: No   • Drug use: No   • Sexual activity: Defer         ALLERGIES  Ace inhibitors; Codeine; and Lisinopril        REVIEW OF SYSTEMS  Review of Systems   Constitutional: Negative for fever.   HENT: Positive for congestion and postnasal drip. Negative for sore throat.    Eyes: Negative.    Respiratory: Positive for cough and shortness of breath.    Cardiovascular: Negative for chest pain.   Gastrointestinal: Negative for abdominal pain, diarrhea and vomiting.   Genitourinary: Negative for dysuria.   Musculoskeletal: Negative for neck pain.   Skin: Negative for rash.   Allergic/Immunologic: Negative.    Neurological: Positive for weakness. Negative for numbness and headaches.   Hematological: Negative.    Psychiatric/Behavioral: Positive for hallucinations.   All other systems reviewed and are negative.           PHYSICAL EXAM      GENERAL: 85-year-old white female well developed, well nourished in no apparent distress  HENT: NCAT, neck supple, trachea midline, TMs are clear, mucous membranes are moist and oropharynx is without erythema or exudate  EYES: no scleral icterus, PERRL, normal conjunctiva  CV: regular rhythm, regular rate, 2/6 systolic murmur  RESPIRATORY: unlabored effort, there are some rales at the left base that improved with cough  ABDOMEN: soft, non-tender,  non-distended  MUSCULOSKELETAL: no gross deformity, no edema  NEURO: alert, CN II-XII grossly intact, sensory and motor function of extremities grossly intact.  SKIN: warm, dry, no rash  PSYCH:  Appropriate mood and affect    Vital signs and nursing notes reviewed.          LAB RESULTS  Recent Results (from the past 24 hour(s))   Urinalysis With Culture If Indicated - Urine, Clean Catch    Collection Time: 09/01/19  1:04 AM   Result Value Ref Range    Color, UA Yellow Yellow, Straw    Appearance, UA Clear Clear    pH, UA 6.5 5.0 - 8.0    Specific Gravity, UA 1.017 1.005 - 1.030    Glucose, UA Negative Negative    Ketones, UA Trace (A) Negative    Bilirubin, UA Negative Negative    Blood, UA Negative Negative    Protein, UA Negative Negative    Leuk Esterase, UA Large (3+) (A) Negative    Nitrite, UA Negative Negative    Urobilinogen, UA 1.0 E.U./dL 0.2 - 1.0 E.U./dL   Urinalysis, Microscopic Only - Urine, Clean Catch    Collection Time: 09/01/19  1:04 AM   Result Value Ref Range    RBC, UA None Seen None Seen, 0-2 /HPF    WBC, UA 21-30 (A) None Seen, 0-2 /HPF    Bacteria, UA None Seen None Seen /HPF    Squamous Epithelial Cells, UA 3-6 (A) None Seen, 0-2 /HPF    Hyaline Casts, UA None Seen None Seen /LPF    Methodology Manual Light Microscopy    Comprehensive Metabolic Panel    Collection Time: 09/01/19  1:37 AM   Result Value Ref Range    Glucose 96 65 - 99 mg/dL    BUN 16 8 - 23 mg/dL    Creatinine 1.01 (H) 0.57 - 1.00 mg/dL    Sodium 131 (L) 136 - 145 mmol/L    Potassium 3.8 3.5 - 5.2 mmol/L    Chloride 94 (L) 98 - 107 mmol/L    CO2 26.8 22.0 - 29.0 mmol/L    Calcium 9.5 8.6 - 10.5 mg/dL    Total Protein 6.5 6.0 - 8.5 g/dL    Albumin 4.10 3.50 - 5.20 g/dL    ALT (SGPT) 11 1 - 33 U/L    AST (SGOT) 18 1 - 32 U/L    Alkaline Phosphatase 95 39 - 117 U/L    Total Bilirubin 0.6 0.2 - 1.2 mg/dL    eGFR Non African Amer 52 (L) >60 mL/min/1.73    Globulin 2.4 gm/dL    A/G Ratio 1.7 g/dL    BUN/Creatinine Ratio 15.8 7.0 -  25.0    Anion Gap 10.2 5.0 - 15.0 mmol/L   Procalcitonin    Collection Time: 09/01/19  1:37 AM   Result Value Ref Range    Procalcitonin 0.13 0.10 - 0.25 ng/mL   BNP    Collection Time: 09/01/19  1:37 AM   Result Value Ref Range    proBNP 1,415.0 5.0-1,800.0 pg/mL   Troponin    Collection Time: 09/01/19  1:37 AM   Result Value Ref Range    Troponin T <0.010 0.000 - 0.030 ng/mL   CBC Auto Differential    Collection Time: 09/01/19  1:37 AM   Result Value Ref Range    WBC 10.21 3.40 - 10.80 10*3/mm3    RBC 4.51 3.77 - 5.28 10*6/mm3    Hemoglobin 13.4 12.0 - 15.9 g/dL    Hematocrit 40.7 34.0 - 46.6 %    MCV 90.2 79.0 - 97.0 fL    MCH 29.7 26.6 - 33.0 pg    MCHC 32.9 31.5 - 35.7 g/dL    RDW 13.9 12.3 - 15.4 %    RDW-SD 46.3 37.0 - 54.0 fl    MPV 11.4 6.0 - 12.0 fL    Platelets 149 140 - 450 10*3/mm3    Neutrophil % 80.8 (H) 42.7 - 76.0 %    Lymphocyte % 10.7 (L) 19.6 - 45.3 %    Monocyte % 6.5 5.0 - 12.0 %    Eosinophil % 0.7 0.3 - 6.2 %    Basophil % 0.4 0.0 - 1.5 %    Immature Grans % 0.9 (H) 0.0 - 0.5 %    Neutrophils, Absolute 8.26 (H) 1.70 - 7.00 10*3/mm3    Lymphocytes, Absolute 1.09 0.70 - 3.10 10*3/mm3    Monocytes, Absolute 0.66 0.10 - 0.90 10*3/mm3    Eosinophils, Absolute 0.07 0.00 - 0.40 10*3/mm3    Basophils, Absolute 0.04 0.00 - 0.20 10*3/mm3    Immature Grans, Absolute 0.09 (H) 0.00 - 0.05 10*3/mm3    nRBC 0.0 0.0 - 0.2 /100 WBC       Ordered the above labs and reviewed the results.        RADIOLOGY  Xr Chest 2 View    Result Date: 9/1/2019  PA AND LATERAL CHEST X-RAY  CLINICAL HISTORY: SOA, cough  COMPARISON: 09/20/1715.  FINDINGS: PA and lateral views of the chest were obtained. The lungs are well expanded and appear clear. Heart size and pulmonary vascularity are normal. No pleural effusions.  Vascular calculi noted. There is a mild pectus excavatum deformity of the bony thorax. Generalized osteopenia        1. No active disease.  This report was finalized on 9/1/2019 1:59 AM by Arnoldo Mueller M.D.       Ct Head Without Contrast    Result Date: 9/1/2019  CRANIAL CT SCAN WITHOUT CONTRAST  CLINICAL HISTORY: hallucinations  COMPARISON: None.  TECHNIQUE: Radiation dose reduction techniques were utilized, including automated exposure control and exposure modulation based on body size. Multiple axial images of the head were obtained without contrast.  FINDINGS:  There are no abnormal areas of increased density or mass effect. Minimal atrophy and chronic-appearing white matter changes noted.  Ventricles, sulci, and cisterns appear normal. Bone window images are unremarkable.         1. No acute intracranial abnormality.      This report was finalized on 9/1/2019 2:00 AM by Arnoldo Mueller M.D.        I ordered the above noted radiological studies. Interpreted by radiologist. Images independently reviewed by me in PACS.    PROCEDURES  Procedures    EKG:           EKG time: 02 23  Rhythm/Rate: Sinus rhythm, 73  P waves and AK: First-degree AV block  QRS, axis: Normal  ST and T waves: Normal    Interpreted Contemporaneously by me, independently viewed  Comparison: Compared to 12/6/2013 the prolonged QT has resolved.        MEDICATIONS GIVEN IN ER  Medications   sodium chloride 0.9 % flush 10 mL (not administered)   labetalol (NORMODYNE,TRANDATE) injection 10 mg (10 mg Intravenous Given 9/1/19 0138)   labetalol (NORMODYNE,TRANDATE) injection 20 mg (20 mg Intravenous Given 9/1/19 0248)       PROGRESS AND CONSULTS  ED Course as of Sep 01 0336   Sun Sep 01, 2019   0249 After dose of 10 labetalol blood pressure still 212/99.  Dose of 20 mg of labetalol ordered.  Patient continues to have some visual hallucinations while here in the ER.  Will call hospitalist for admission.  [EB]   0312 Discussed with Meenu for LHA.  Will admit for control of her blood pressure and further evaluation.  [EB]      ED Course User Index  [EB] Nickolas Childress MD           MEDICAL DECISION MAKING  Review of old medical records -primary care is with  [facility] in [place].  Patient was last seen there in July for follow-up of her diabetes, hypertension, CHF, hyperlipidemia and seasonal allergic rhinitis.    MDM           DIAGNOSIS  Final diagnoses:   Uncontrolled hypertension   Visual hallucinations   Upper respiratory tract infection, unspecified type         DISPOSITION  ADMISSION    Discussed treatment plan and reason for admission with pt/family and admitting physician.  Pt/family voiced understanding of the plan for admission for further testing/treatment as needed.         Nickolas Ying MD  09/01/19 0338

## 2019-09-01 NOTE — PLAN OF CARE
Problem: Patient Care Overview  Goal: Plan of Care Review  Outcome: Ongoing (interventions implemented as appropriate)   09/01/19 0646   Coping/Psychosocial   Plan of Care Reviewed With patient;daughter   Plan of Care Review   Progress no change   OTHER   Outcome Summary Pt a&ox4. /80 upon being admitted to unit. 10mg Hydralazine administered. BP dropped to 158 systolic upon recheck. Resp viral panel gotten. Awaiting results. No c/o pain. Currently resting comfortably in bed. VS otherwise stable. Will continue to monitor.      Goal: Individualization and Mutuality  Outcome: Ongoing (interventions implemented as appropriate)    Goal: Discharge Needs Assessment  Outcome: Ongoing (interventions implemented as appropriate)    Goal: Interprofessional Rounds/Family Conf  Outcome: Ongoing (interventions implemented as appropriate)      Problem: Fall Risk (Adult)  Goal: Identify Related Risk Factors and Signs and Symptoms  Outcome: Ongoing (interventions implemented as appropriate)    Goal: Absence of Fall  Outcome: Ongoing (interventions implemented as appropriate)      Problem: Anxiety (Adult)  Goal: Identify Related Risk Factors and Signs and Symptoms  Outcome: Ongoing (interventions implemented as appropriate)    Goal: Reduction/Resolution  Outcome: Ongoing (interventions implemented as appropriate)      Problem: Infection, Risk/Actual (Adult)  Goal: Identify Related Risk Factors and Signs and Symptoms  Outcome: Ongoing (interventions implemented as appropriate)    Goal: Infection Prevention/Resolution  Outcome: Ongoing (interventions implemented as appropriate)

## 2019-09-02 PROBLEM — G93.41 ENCEPHALOPATHY, METABOLIC: Status: ACTIVE | Noted: 2019-01-01

## 2019-09-02 PROBLEM — G92.9 ENCEPHALOPATHY, TOXIC: Status: ACTIVE | Noted: 2019-01-01

## 2019-09-02 PROBLEM — B34.8 RHINOVIRUS INFECTION: Status: ACTIVE | Noted: 2019-01-01

## 2019-09-02 PROBLEM — R44.3 HALLUCINATIONS: Status: ACTIVE | Noted: 2019-01-01

## 2019-09-02 NOTE — PLAN OF CARE
Problem: Patient Care Overview  Goal: Plan of Care Review  Outcome: Ongoing (interventions implemented as appropriate)   09/02/19 0529   Coping/Psychosocial   Plan of Care Reviewed With patient;daughter   Plan of Care Review   Progress no change   OTHER   Outcome Summary Pt a&ox4. Resp viral panel +Rhino Virus. Pt currently in Droplet isolation. No c/o anxiety or dizziness this evening. Pt's blood pressure much better. Pt's daughter @ bedside. VS stable. Currently resting comfortably in bed. Will continue to monitor.      Goal: Individualization and Mutuality  Outcome: Ongoing (interventions implemented as appropriate)    Goal: Discharge Needs Assessment  Outcome: Ongoing (interventions implemented as appropriate)    Goal: Interprofessional Rounds/Family Conf  Outcome: Ongoing (interventions implemented as appropriate)      Problem: Fall Risk (Adult)  Goal: Identify Related Risk Factors and Signs and Symptoms  Outcome: Ongoing (interventions implemented as appropriate)    Goal: Absence of Fall  Outcome: Ongoing (interventions implemented as appropriate)      Problem: Anxiety (Adult)  Goal: Identify Related Risk Factors and Signs and Symptoms  Outcome: Ongoing (interventions implemented as appropriate)    Goal: Reduction/Resolution  Outcome: Ongoing (interventions implemented as appropriate)      Problem: Infection, Risk/Actual (Adult)  Goal: Identify Related Risk Factors and Signs and Symptoms  Outcome: Ongoing (interventions implemented as appropriate)    Goal: Infection Prevention/Resolution  Outcome: Ongoing (interventions implemented as appropriate)

## 2019-09-02 NOTE — PLAN OF CARE
Problem: Patient Care Overview  Goal: Plan of Care Review  Outcome: Ongoing (interventions implemented as appropriate)   09/02/19 1431   Coping/Psychosocial   Plan of Care Reviewed With patient   Plan of Care Review   Progress improving   OTHER   Outcome Summary Pt presented to PT today with weakness and SOB with minimal exertion. Pt able to perform functional mobility with CGAx1 and ambulate 1x around unit with rwx, increasing assist needed as she fatigued for last 50' of ambulation. Pt will continue to benefit from skilled PT to address functional mobility deficits and decreae her risk for falls.

## 2019-09-02 NOTE — PLAN OF CARE
Problem: Patient Care Overview  Goal: Plan of Care Review  Outcome: Ongoing (interventions implemented as appropriate)   09/02/19 9207   Coping/Psychosocial   Plan of Care Reviewed With patient   Plan of Care Review   Progress improving   OTHER   Outcome Summary Patients sodium took a dive today, urine collected, and fluids stopped. encouraging pulmonary hygene due to rhinovirus S/S. ambulation to chair TID, increased activity, and supportive care as needed. Breathing treatments changed to q6h due to wheezing and grunting, and family request.        Problem: Fall Risk (Adult)  Goal: Identify Related Risk Factors and Signs and Symptoms  Outcome: Outcome(s) achieved Date Met: 09/02/19    Goal: Absence of Fall  Outcome: Ongoing (interventions implemented as appropriate)      Problem: Anxiety (Adult)  Goal: Identify Related Risk Factors and Signs and Symptoms  Outcome: Outcome(s) achieved Date Met: 09/02/19    Goal: Reduction/Resolution  Outcome: Ongoing (interventions implemented as appropriate)      Problem: Infection, Risk/Actual (Adult)  Goal: Identify Related Risk Factors and Signs and Symptoms  Outcome: Outcome(s) achieved Date Met: 09/02/19    Goal: Infection Prevention/Resolution  Outcome: Ongoing (interventions implemented as appropriate)

## 2019-09-02 NOTE — THERAPY EVALUATION
Patient Name: Etelvina Bales  : 1933    MRN: [id number]                              Today's Date: 2019       Admit Date: 2019    Visit Dx:     ICD-10-CM ICD-9-CM   1. Uncontrolled hypertension I10 401.9   2. Visual hallucinations R44.1 368.16   3. Upper respiratory tract infection, unspecified type J06.9 465.9     Patient Active Problem List   Diagnosis   • Atopic rhinitis   • Chronic kidney disease, stage III (moderate) (CMS/[facility])   • Essential hypertension   • Heart murmur   • Hyperkalemia   • Hyperlipidemia   • Rosacea   • Diabetes mellitus type II, non insulin dependent (CMS/[facility])   • Health care maintenance   • Uncontrolled hypertension   • Hallucinations   • Encephalopathy, toxic   • Encephalopathy, metabolic   • Rhinovirus infection     Past Medical History:   Diagnosis Date   • Cardiomyopathy (CMS/[facility])    • CHF (congestive heart failure) (CMS/[facility])    • Chronic kidney disease    • Diabetes mellitus (CMS/[facility])    • Heart murmur    • Hyperlipidemia    • Hypertension    • Hypokalemia    • Rosacea      Past Surgical History:   Procedure Laterality Date   • ABDOMINAL SURGERY     • APPENDECTOMY     • CATARACT EXTRACTION     • CHOLECYSTECTOMY     • COLONOSCOPY     • HYSTERECTOMY       General Information     Row Name 19 1422          PT Evaluation Time/Intention    Document Type  evaluation  -     Mode of Treatment  physical therapy  -     Row Name 19 1422          General Information    Patient Profile Reviewed?  yes  -CH     Prior Level of Function  independent:;all household mobility;community mobility  -     Existing Precautions/Restrictions  fall  -     Barriers to Rehab  none identified  -     Row Name 19 1422          Cognitive Assessment/Intervention- PT/OT    Orientation Status (Cognition)  oriented x 3  -CH     Row Name 19 1422          Safety Issues, Functional Mobility    Safety Issues Affecting Function (Mobility)  at risk behavior observed;insight  into deficits/self awareness;awareness of need for assistance  -     Impairments Affecting Function (Mobility)  shortness of breath;strength;endurance/activity tolerance  -       User Key  (r) = Recorded By, (t) = Taken By, (c) = Cosigned By    Initials Name Provider Type     Saulo Shirley, PT Physical Therapist        Mobility     Row Name 09/02/19 1424          Bed Mobility Assessment/Treatment    Bed Mobility Assessment/Treatment  bed mobility (all) activities  -     [place] Level (Bed Mobility)  standby assist  -     Row Name 09/02/19 1424          Sit-Stand Transfer    Sit-Stand [place] (Transfers)  contact guard;1 person assist  -     Assistive Device (Sit-Stand Transfers)  walker, front-wheeled  -     Row Name 09/02/19 1424          Gait/Stairs Assessment/Training    [place] Level (Gait)  contact guard;minimum assist (75% patient effort);1 person assist  -     Assistive Device (Gait)  walker, front-wheeled  -     Distance in Feet (Gait)  150  -     Comment (Gait/Stairs)  increasing assist as pt fatigued during ambulation  -       User Key  (r) = Recorded By, (t) = Taken By, (c) = Cosigned By    Initials Name Provider Type     Saulo Shirley, PT Physical Therapist        Obj/Interventions     Row Name 09/02/19 1425          General ROM    GENERAL ROM COMMENTS  no BLE AROM deficits idenitified  -     Row Name 09/02/19 1425          MMT (Manual Muscle Testing)    General MMT Comments  BLE MMTs grossly >3/5  -     Row Name 09/02/19 1425          Static Sitting Balance    Level of [place] (Unsupported Sitting, Static Balance)  supervision  -     Time Able to Maintain Position (Unsupported Sitting, Static Balance)  2 to 3 minutes  -       User Key  (r) = Recorded By, (t) = Taken By, (c) = Cosigned By    Initials Name Provider Type     Saulo Shirley, PT Physical Therapist        Goals/Plan     Row Name 09/02/19 1429          Transfer Goal 1 (PT)     Activity/Assistive Device (Transfer Goal 1, PT)  transfers, all  -CH     [place] Level/Cues Needed (Transfer Goal 1, PT)  standby assist  -CH     Time Frame (Transfer Goal 1, PT)  1 week  -     Row Name 09/02/19 1429          Gait Training Goal 1 (PT)    Activity/Assistive Device (Gait Training Goal 1, PT)  gait (walking locomotion)  -CH     [place] Level (Gait Training Goal 1, PT)  standby assist  -CH     Distance (Gait Goal 1, PT)  150 (w/o AD)  -CH     Time Frame (Gait Training Goal 1, PT)  1 week  -       User Key  (r) = Recorded By, (t) = Taken By, (c) = Cosigned By    Initials Name Provider Type    CH Saulo Shirley, PT Physical Therapist        Clinical Impression     Row Name 09/02/19 1427          Pain Assessment    Additional Documentation  Pain Scale: Numbers Pre/Post-Treatment (Group)  -[facility] Name 09/02/19 1427          Pain Scale: Numbers Pre/Post-Treatment    Pain Scale: Numbers, Pretreatment  0/10 - no pain  -     Pain Scale: Numbers, Post-Treatment  0/10 - no pain  -[facility] Name 09/02/19 1427          Plan of Care Review    Plan of Care Reviewed With  patient  -[facility] Name 09/02/19 1427          Physical Therapy Clinical Impression    Patient/Family Goals Statement (PT Clinical Impression)  D/C home  -     Criteria for Skilled Interventions Met (PT Clinical Impression)  yes;treatment indicated  -     Rehab Potential (PT Clinical Summary)  good, to achieve stated therapy goals  -[facility] Name 09/02/19 1427          Vital Signs    Post SpO2 (%)  100  -     O2 Delivery Post Treatment  supplemental O2  -[facility] Name 09/02/19 1427          Positioning and Restraints    Pre-Treatment Position  in bed  -     Post Treatment Position  bed  -     In Bed  supine;call light within reach;encouraged to call for assist;with family/caregiver  -       User Key  (r) = Recorded By, (t) = Taken By, (c) = Cosigned By    Initials Name Provider Type    Saulo Santa, PT  Physical Therapist        Outcome Measures     Row Name 09/02/19 1433          How much help from another person do you currently need...    Turning from your back to your side while in flat bed without using bedrails?  4  -CH     Moving from lying on back to sitting on the side of a flat bed without bedrails?  4  -CH     Moving to and from a bed to a chair (including a wheelchair)?  3  -CH     Standing up from a chair using your arms (e.g., wheelchair, bedside chair)?  3  -CH     Climbing 3-5 steps with a railing?  2  -CH     To walk in hospital room?  3  -CH     AM-PAC 6 Clicks Score (PT)  19  -     Row Name 09/02/19 1433          Functional Assessment    Outcome Measure Options  AM-PAC 6 Clicks Basic Mobility (PT)  -       User Key  (r) = Recorded By, (t) = Taken By, (c) = Cosigned By    Initials Name Provider Type     Saulo Shirley, [place] Physical Therapist        Physical Therapy Education     Title: PT OT SLP Therapies (In Progress)     Topic: Physical Therapy (In Progress)     Point: Mobility training (In Progress)     Learning Progress Summary           Patient Acceptance, E, NR by  at 9/2/2019  2:31 PM                   Point: Home exercise program (In Progress)     Learning Progress Summary           Patient Acceptance, E, NR by  at 9/2/2019  2:31 PM                   Point: Body mechanics (In Progress)     Learning Progress Summary           Patient Acceptance, E, NR by  at 9/2/2019  2:31 PM                   Point: Precautions (In Progress)     Learning Progress Summary           Patient Acceptance, E, NR by  at 9/2/2019  2:31 PM                               User Key     Initials Effective Dates Name Provider Type [facility] 04/03/18 -  Saulo Shirley, PT Physical Therapist PT              PT Recommendation and Plan     Outcome Summary/Treatment Plan (PT)  Anticipated Discharge Disposition (PT): home with assist, home with home health  Plan of Care Reviewed With: patient  Progress:  improving  Outcome Summary: Pt presented to PT today with weakness and SOB with minimal exertion. Pt able to perform functional mobility with CGAx1 and ambulate 1x around unit with rwx, increasing assist needed as she fatigued for last 50' of ambulation. Pt will continue to benefit from skilled PT to address functional mobility deficits and decreae her risk for falls.     Time Calculation:   PT Charges     Row Name 09/02/19 1433             Time Calculation    Start Time  1400  -      Stop Time  1414  -      Time Calculation (min)  14 min  -      PT Received On  09/02/19  -      PT - Next Appointment  09/03/19  -      PT Goal Re-Cert Due Date  09/09/19  -        User Key  (r) = Recorded By, (t) = Taken By, (c) = Cosigned By    Initials Name Provider Type     Saulo Shirley PT Physical Therapist        Therapy Charges for Today     Code Description Service Date Service Provider Modifiers Qty    59723154047 HC PT EVAL LOW COMPLEXITY 2 9/2/2019 Saulo Shirley, PT GP 1    13067087835 HC PT THER PROC EA 15 MIN 9/2/2019 Saulo Shirley PT GP 1          PT G-Codes  Outcome Measure Options: AM-PAC 6 Clicks Basic Mobility (PT)  AM-PAC 6 Clicks Score (PT): 19    Saulo Shirley PT  9/2/2019

## 2019-09-02 NOTE — PROGRESS NOTES
LOS: 1 day     Name: Etelvina Bales  Age/Sex: 85 y.o. female  :  1933        PCP: Simon Bay MD  Chief Complaint   Patient presents with   • Cough   • Hallucinations      Subjective   Feeling better today.  No further hallucinations.  Still coughing still little short of breath.  Still sounds very congested.  General: No Fever or Chills, Cardiac: No Chest Pain or Palpitations, Resp: No Cough or SOA, GI: No Nausea, Vomiting, or Diarrhea and Other: No bleeding      carvedilol 6.25 mg Oral BID   guaiFENesin 600 mg Oral Q12H   insulin lispro 0-7 Units Subcutaneous 4x Daily With Meals & Nightly   losartan 50 mg Oral Daily   sodium chloride 10 mL Intravenous Q12H       lactated ringers 75 mL/hr Last Rate: 75 mL/hr (19 1742)       Objective   Vital Signs  Temp:  [97.5 °F (36.4 °C)-98.5 °F (36.9 °C)] 98 °F (36.7 °C)  Heart Rate:  [69-97] 89  Resp:  [16-18] 18  BP: (132-186)/(49-74) 167/65  Body mass index is 22.02 kg/m².    Intake/Output Summary (Last 24 hours) at 2019 0601  Last data filed at 2019 0735  Gross per 24 hour   Intake 210 ml   Output --   Net 210 ml       Physical Exam   Constitutional: She is oriented to person, place, and time. She appears well-developed and well-nourished.   HENT:   Head: Normocephalic and atraumatic.   Eyes: Conjunctivae and EOM are normal.   Cardiovascular: Normal rate, regular rhythm and normal heart sounds.   Pulmonary/Chest: Effort normal. No respiratory distress. She has wheezes. She has no rales.   Abdominal: Soft. Bowel sounds are normal.   Neurological: She is alert and oriented to person, place, and time.   Skin: Skin is warm and dry.   Nursing note and vitals reviewed.        Results Review:       I reviewed the patient's new clinical results.  Results from last 7 days   Lab Units 19  0531 19  0137   WBC 10*3/mm3 9.50 10.21   HEMOGLOBIN g/dL 13.3 13.4   PLATELETS 10*3/mm3 161 149     Results from last 7 days   Lab Units  09/02/19  0856 09/01/19  0531 09/01/19  0137   SODIUM mmol/L 126* 129* 131*   POTASSIUM mmol/L 4.0 3.8 3.8   CHLORIDE mmol/L 91* 94* 94*   CO2 mmol/L 25.6 24.1 26.8   BUN mg/dL 12 15 16   CREATININE mg/dL 0.91 0.95 1.01*   CALCIUM mg/dL 8.9 9.6 9.5   PHOSPHORUS mg/dL 2.6  --   --    Estimated Creatinine Clearance: 37.3 mL/min (by C-G formula based on SCr of 0.95 mg/dL).      Assessment/Plan     Chronic kidney disease, stage III (moderate) (CMS/HCC)    Essential hypertension    Heart murmur    Hyperlipidemia    Diabetes mellitus type II, non insulin dependent (CMS/HCC)    Uncontrolled hypertension    Hallucinations    Encephalopathy, toxic    Encephalopathy, metabolic    Rhinovirus infection      PLAN  -Sodium is dropped further despite being off her HCTZ and getting IV fluids.  I do not see any medications that would be a culprit here except for the HCTZ which she has been taking.  She has chronic hyponatremia.  I am to check a urine sodium level I suspect she probably has underlying mild SIADH.  There is also a component of poor solute intake given being sick for the last week.  -Continue supportive care for the rhinovirus infection  -Encephalopathies appeared to have resolved.  No further hallucinations      Disposition  Monitor overnight if does well home tomorrow      Oscar Holman MD  [facility]  09/02/19  6:01 AM

## 2019-09-03 NOTE — PROGRESS NOTES
"      [facility]         Dr Spann Sayied   LOS: 2 days   Patient Care Team:  Simon Bay MD as PCP - General (Internal Medicine)    Chief Complaint: Acute stridor due to rhinovirus hallucinations encephalopathy due to above    Interval History: Events noted chart reviewed.  Called to evaluate patient with worsening stridor on BiPAP.  Patient confused unable to give me any history.  Currently on AVAPS mode with sats 100%.    REVIEW OF SYSTEMS:   Unable to get with the present present condition    Ventilator/Non-Invasive Ventilation Settings (From admission, onward)    Start     Ordered    09/03/19 0717  NIPPV-IPPV / BIPAP / CPAP  As Needed-RT     Question Answer Comment   Type: BIPAP    NIPPV Mask Interface: Full Face Mask    IPAP 12    EPAP 6        09/03/19 0716            Vital Signs  Temp:  [96.8 °F (36 °C)-98.3 °F (36.8 °C)] 97.4 °F (36.3 °C)  Heart Rate:  [] 93  Resp:  [18-26] 26  BP: (105-239)/() 159/72    Intake/Output Summary (Last 24 hours) at 9/3/2019 0940  Last data filed at 9/2/2019 2030  Gross per 24 hour   Intake 400 ml   Output --   Net 400 ml     Flowsheet Rows      First Filed Value   Admission Height  157.5 cm (62\") Documented at 08/31/2019 2344   Admission Weight  57.1 kg (125 lb 14.4 oz) Documented at 09/01/2019 0124          Physical Exam:   General Appearance:   On AVAPS confused.  Tidal volume anywhere between 3-400  Neck audible stridor with minimal airflow.  Sats 100% on the monitor   Lungs:    Decreased breath sounds bilaterally    Heart:    Regular rhythm and normal rate, normal S1 and S2, no            murmur, no gallop, no rub, no click   Chest Wall:    No abnormalities observed   Abdomen:     Normal bowel sounds, no masses, no organomegaly, soft        non-tender, non-distended, no guarding, no rebound                tenderness   Extremities:   Moves all extremities well, no edema, no cyanosis, no             redness  CNS intact     Results Review:  "       Results from last 7 days   Lab Units 09/03/19  0154 09/02/19  0856 09/01/19  0531   SODIUM mmol/L 128* 126* 129*   POTASSIUM mmol/L 4.1 4.0 3.8   CHLORIDE mmol/L 90* 91* 94*   CO2 mmol/L 25.5 25.6 24.1   BUN mg/dL 14 12 15   CREATININE mg/dL 0.78 0.91 0.95   GLUCOSE mg/dL 119* 141* 96   CALCIUM mg/dL 9.1 8.9 9.6     Results from last 7 days   Lab Units 09/01/19  0137   TROPONIN T ng/mL <0.010     Results from last 7 days   Lab Units 09/03/19  0154 09/01/19  0531 09/01/19  0137   WBC 10*3/mm3 8.80 9.50 10.21   HEMOGLOBIN g/dL 12.6 13.3 13.4   HEMATOCRIT % 37.3 39.3 40.7   PLATELETS 10*3/mm3 196 161 149             Results from last 7 days   Lab Units 09/03/19  0154   MAGNESIUM mg/dL 1.9         Results from last 7 days   Lab Units 09/03/19  0150   PH, ARTERIAL pH units 7.330*   PO2 ART mm Hg 76.7*   PCO2, ARTERIAL mm Hg 49.4*   HCO3 ART mmol/L 26.0       I reviewed the patient's new clinical results.  I personally viewed and interpreted the patient's CXR        Medication Review:     albuterol 2.5 mg Nebulization Q4H - RT   amLODIPine 5 mg Oral Q24H   carvedilol 6.25 mg Oral BID   dexamethasone 5 mg Intravenous Q6H   guaiFENesin 600 mg Oral Q12H   insulin lispro 0-7 Units Subcutaneous 4x Daily With Meals & Nightly   sodium chloride 10 mL Intravenous Q12H            ASSESSMENT:   Acute stridor  Rhinovirus URI bronchitis  Hallucination  Uncontrolled hypertension  Hyponatremia  Hypercapnic respiratory failure  Encephalopathy due to above    PLAN:  At this point patient started on steroids and racemic epi  Currently on BiPAP/AVAPS with decent tidal volume.  However she has continued confusion also.  I had a long discussion with the patient's daughter and son at bedside.  They do not want aggressive care at this time.  They want to make her a DNR.  I offered intubation and bronchoscopy which they declined at this time.  She is too unstable to go down for CT neck.  I would continue current supportive care.   BiPAP/AVAPS was adjusted  Continue with aggressive supportive care  Family aware that patients condition is guarded at this time    Maria Ines Bazan MD  09/03/19  9:40 AM      Time: Critical care 35 minutes min

## 2019-09-03 NOTE — NURSING NOTE
Dr. Clementied at bedside with family and patient.  Agreed to not take patient to CT at this time due to unstable condition. Relayed information to CT.

## 2019-09-03 NOTE — CONSULTS
Referring Provider: Dr. Holman  Reason for Consultation: Stridor    Patient Care Team:  Simon Bay MD as PCP - General (Internal Medicine)    Chief complaint:   Cough and confusion    History of present illness:  Subjective   History was limited due to confusion.    This is an 84-year-old female patient, lifelong non-smoker with no history of respiratory disease.  She was hospitalized on 9/1 for hallucination and cough.  She tested positive for rhinovirus.  She is currently having cough productive of frothy whitish phlegm.  I was called stat to evaluate the patient due to stridor.  Apparently, her stridor started around 7 PM.  Rapid response team was called earlier on her but they suspect that an anxiety component playing a role since her stridor was intermittent.  According to her nurse, her stridor has been persistent since 7 PM.  On my palpation, she was mostly having stridor with each breath with occasional breaths without stridor.  She does not have history of anxiety according to the family but she appeared to be anxious for me and stated that she thinks she is going to die.  Off note, she had one NT suctioning earlier and reportedly she improved a little better.    Patient is confused again but she was able to provide with some information.  She was able to tell me that she worked at a [email or web address].  She is also hallucinating according to the staff.  She has not slept for 40 hours reportedly.  She stated that  she goes into bouts of difficulty breathing.   She did not really appear to be in distress with the exception of audible stridor.  She did not require oxygen but has been asking to wear the oxygen and her SPO2 is 100%.  She is not using accessory muscles.  Her family including the son and the sister are at bedside.    She has been receiving nebulizer therapy with albuterol without help.  There are no new medications on her with exception of Mucinex and nebulizers.  She takes  Cozaar at home.  She denies prior history of stridor.    Labs reviewed:  ABG earlier showed pH 7.33/4 9/76 on room air;  Sodium 128; WBC 8; hemoglobin 12; procalcitonin normal    Review of Systems  Cannot obtain due to confusion    History  Past Medical History:   Diagnosis Date   • Cardiomyopathy (CMS/HCC)    • CHF (congestive heart failure) (CMS/HCC)    • Chronic kidney disease    • Diabetes mellitus (CMS/HCC)    • Heart murmur    • Hyperlipidemia    • Hypertension    • Hypokalemia    • Rosacea    ,   Past Surgical History:   Procedure Laterality Date   • ABDOMINAL SURGERY     • APPENDECTOMY     • CATARACT EXTRACTION     • CHOLECYSTECTOMY     • COLONOSCOPY     • HYSTERECTOMY     ,   Family History   Problem Relation Age of Onset   • Heart disease Mother    • Stroke Mother    • Early death Father    • Heart disease Father    • Diabetes Sister    • Hypertension Sister    • Kidney disease Sister    • Cancer Brother    • Hypertension Brother    • Asthma Son    • Bleeding Disorder Son    • Early death Son    ,   Social History     Tobacco Use   • Smoking status: Never Smoker   • Smokeless tobacco: Never Used   Substance Use Topics   • Alcohol use: No   • Drug use: No   ,   Medications Prior to Admission   Medication Sig Dispense Refill Last Dose   • Blood Glucose Monitoring Suppl (ONE TOUCH ULTRA 2) W/DEVICE kit    Taking   • carvedilol (COREG) 6.25 MG tablet TAKE ONE TABLET BY MOUTH TWICE A DAY 60 tablet 0    • doxycycline (VIBRAMYICN) 100 MG tablet TAKE ONE TABLET BY MOUTH DAILY 30 tablet 0    • glimepiride (AMARYL) 2 MG tablet Take 1 tablet by mouth 2 (Two) Times a Day. (Patient taking differently: Take 2 mg by mouth Daily.) 180 tablet 0    • glucose blood test strip OneTouch Ultra Blue In Vitro Strip; Patient Sig: OneTouch Ultra Blue In Vitro Strip TEST BLOOD SUGAR DAILY; 50; 0; 17-Dec-2013; Active   Taking   • hydrochlorothiazide (MICROZIDE) 12.5 MG capsule Take 1 capsule by mouth Daily. 90 capsule 1    • losartan  (COZAAR) 50 MG tablet Take 1 tablet by mouth Daily. 90 tablet 0    • nitroglycerin (NITROSTAT) 0.4 MG SL tablet Place  under the tongue.   Taking   • ONE TOUCH ULTRA TEST test strip USE TO TEST DAILY 100 each 4 Taking   • ONETOUCH DELICA LANCETS 33G misc    Taking   • montelukast (SINGULAIR) 10 MG tablet Take 1 tablet by mouth Every Night. 30 tablet 2    , Scheduled Meds:    albuterol 2.5 mg Nebulization Q4H - RT   carvedilol 6.25 mg Oral BID   guaiFENesin 600 mg Oral Q12H   insulin lispro 0-7 Units Subcutaneous 4x Daily With Meals & Nightly   losartan 50 mg Oral Daily   sodium chloride 10 mL Intravenous Q12H   , Continuous Infusions:   , PRN Meds:  •  acetaminophen **OR** acetaminophen **OR** acetaminophen  •  ALPRAZolam  •  benzocaine-menthol  •  dextrose  •  dextrose  •  glucagon (human recombinant)  •  hydrALAZINE  •  HYDROcodone-homatropine  •  ondansetron  •  [COMPLETED] Insert peripheral IV **AND** sodium chloride  •  sodium chloride and Allergies:  Ace inhibitors; Codeine; and Lisinopril    Objective     Vital Signs   Temp:  [96.8 °F (36 °C)-98.3 °F (36.8 °C)] 97.5 °F (36.4 °C)  Heart Rate:  [67-80] 70  Resp:  [18-22] 22  BP: (135-198)/(51-82) 198/82    Physical Exam:  Constitutional: Audible stridor.  No significant respiratory distress.  Eyes: Injected conjunctiva, EOMI. pupils equal reactive to light.  ENMT: Orozco 3. No oral thrush. Tonsils grade 1  Neck: Trachea midline. No thyromegaly.  Audible stridor of the auscultation of the neck  Heart: RRR, no murmur  Lungs: Diffuse bilateral inspiratory wheezing related to stridor.  No crackles.  Nonlabored breathing at rest  Abdomen: Soft. No tenderness or dullness. No HSM.  Extremities: No cyanosis, clubbing or pitting edema.  Warm extremities and well-perfused.  Neuro: Conscious, alert  Strength 5/5 in arms.  Psych: Appropriate mood and affect.    Integumentary: No rash.  Normal skin turgor  Lymphatic: No palpable cervical or supraclavicular lymph  nodes.      Diagnostic imaging:  I personally and independently reviewed the following images:   Relatively clear lungs with no pulmonary infiltrates          Assessment     1. Stridor: She has an element of anxiety raising the suspicion for VCD but I cannot just explain that with anxiety.  It could very well be related to secretions especially after improvement with NT suctioning.  2. Rhinovirus URI/bronchitis  3. Hallucination  4. Uncontrolled hypertension  5. Hyponatremia  6. Mild acute hypercapnic respiratory failure  7. Encephalopathy, secondary to all the above    Recommendations:  · Gave racemic epinephrine x1 stat.  No improvement noted.  · Check CT of the neck without contrast stat.  · We will ask ENT to evaluate.  · Placed on NIPPV due to hypercapnia and stridor.  · Haldol 2 mg IV x1 administered for hallucination and lack of sleep.  This did not help her stridor and she was still wide awake.  · Start Decadron.  I was hoping to avoid it due to encephalopathy and fear of worsening her condition.  It can be tapered down gradually  · Stop Cozaar due to stridor.  Start amlodipine 5 instead.  Add hydralazine 10 mg every 4 as needed    Transfer to ICU for close observation        I discussed the patients findings and my recommendations with patient, family and nursing staff    Danyelle Durand MD  09/03/19  3:58 AM    Time: Critical care 43 min

## 2019-09-03 NOTE — NURSING NOTE
STAT page for Dr. Carreon. MD returned call and RN updated MD about pt's condition. MD said he will be up to see pt after dealing with other emergencies.

## 2019-09-03 NOTE — SIGNIFICANT NOTE
MD called RN to notify that pt is being transferred to CCU. Pt belongings packed and pt has been moved and report given to Loida MOORE.

## 2019-09-03 NOTE — PLAN OF CARE
Problem: Patient Care Overview  Goal: Plan of Care Review  Outcome: Ongoing (interventions implemented as appropriate)   09/03/19 2922   Coping/Psychosocial   Plan of Care Reviewed With patient   Plan of Care Review   Progress declining   OTHER   Outcome Summary Patient transferred to [facility] for comfort measures. Family at bedside oriented to unit and questions addressed. Patient minimally responsive. She's in recovery position for terminal congestion. Patient appears comfortable at rest. Will continue to monitor.      Goal: Individualization and Mutuality  Outcome: Ongoing (interventions implemented as appropriate)    Goal: Discharge Needs Assessment  Outcome: Ongoing (interventions implemented as appropriate)    Goal: Interprofessional Rounds/Family Conf  Outcome: Ongoing (interventions implemented as appropriate)      Problem: Fall Risk (Adult)  Goal: Absence of Fall  Outcome: Ongoing (interventions implemented as appropriate)      Problem: Anxiety (Adult)  Goal: Reduction/Resolution  Outcome: Ongoing (interventions implemented as appropriate)      Problem: Infection, Risk/Actual (Adult)  Goal: Infection Prevention/Resolution  Outcome: Ongoing (interventions implemented as appropriate)      Problem: Skin Injury Risk (Adult)  Goal: Identify Related Risk Factors and Signs and Symptoms  Outcome: Outcome(s) achieved Date Met: 09/03/19    Goal: Skin Health and Integrity  Outcome: Ongoing (interventions implemented as appropriate)      Problem: Dying Patient, Actively (Adult)  Goal: Identify Related Risk Factors and Signs and Symptoms  Outcome: Outcome(s) achieved Date Met: 09/03/19    Goal: Comfort/Pain Control  Outcome: Ongoing (interventions implemented as appropriate)    Goal: Peace/Preservation of Dignity During the Dying Process  Outcome: Ongoing (interventions implemented as appropriate)

## 2019-09-03 NOTE — NURSING NOTE
"Rapid response called for change in mental status and labored breathing with stridor and expiratory grunting noted. Breathing pattern had gradually gotten worse overnight. Pt has been very restless and anxious. Pt kept stating \"I'm going to die.\" RN called Dr. Paiz earlier in the shift about anxiousness and breathing pattern, Xanax was ordered and given. No relief. Pt currently having visual hallucinations of people behind the walls. BP was elevated at 198 SBP. Hydralazine IV given. Chest xray, ABG's and blood work completed. Pulmonology consult ordered. Daughter is bedside. Suction set up to help get rid of the mucous pt is coughing up. Will continue to monitor closely.  "

## 2019-09-03 NOTE — NURSING NOTE
"Aide reported that family refused patient bath.  Spoke to family about refusal and told them about our daily baths on the unit and decreasing the risk of infection.  Family states that she was \"cleaned up well\" and hair was shampooed and 09/02/2019 by daughter.   "

## 2019-09-03 NOTE — PLAN OF CARE
Problem: Patient Care Overview  Goal: Plan of Care Review  Outcome: Ongoing (interventions implemented as appropriate)   09/03/19 1704   Coping/Psychosocial   Plan of Care Reviewed With patient   Plan of Care Review   Progress declining   OTHER   Outcome Summary Patient remains in CCU for most of shift. Off BIPAP this afternoon, on 4LNC. Family wishes for comfort care. Bed request for [facility]. Patient transferred this afternoon.        Problem: Fall Risk (Adult)  Goal: Absence of Fall  Outcome: Ongoing (interventions implemented as appropriate)   09/03/19 1704   Fall Risk (Adult)   Absence of Fall making progress toward outcome       Problem: Anxiety (Adult)  Goal: Reduction/Resolution  Outcome: Ongoing (interventions implemented as appropriate)   09/03/19 1704   Anxiety (Adult)   Reduction/Resolution making progress toward outcome       Problem: Infection, Risk/Actual (Adult)  Goal: Infection Prevention/Resolution  Outcome: Ongoing (interventions implemented as appropriate)   09/03/19 1704   Infection, Risk/Actual (Adult)   Infection Prevention/Resolution making progress toward outcome       Problem: Skin Injury Risk (Adult)  Goal: Skin Health and Integrity  Outcome: Ongoing (interventions implemented as appropriate)   09/03/19 1704   Skin Injury Risk (Adult)   Skin Health and Integrity making progress toward outcome

## 2019-09-03 NOTE — PLAN OF CARE
Problem: Patient Care Overview  Goal: Plan of Care Review  Outcome: Ongoing (interventions implemented as appropriate)   09/03/19 0520   Coping/Psychosocial   Plan of Care Reviewed With patient;family   Plan of Care Review   Progress no change   OTHER   Outcome Summary Pt has had stridor tonight with declining mental status. Hallucinations and mumbling speech. Has not slept any tonight. Family is bedside. O2 has been WNL despite pt c/o struggling with breathing. Placed on 3L o2 for comfort. Haldol ordered per MD as well as racemic epi nebulizer. Will continue to monitor closely     Goal: Individualization and Mutuality  Outcome: Ongoing (interventions implemented as appropriate)    Goal: Discharge Needs Assessment  Outcome: Ongoing (interventions implemented as appropriate)    Goal: Interprofessional Rounds/Family Conf  Outcome: Ongoing (interventions implemented as appropriate)      Problem: Fall Risk (Adult)  Goal: Absence of Fall  Outcome: Ongoing (interventions implemented as appropriate)      Problem: Anxiety (Adult)  Goal: Reduction/Resolution  Outcome: Ongoing (interventions implemented as appropriate)      Problem: Infection, Risk/Actual (Adult)  Goal: Infection Prevention/Resolution  Outcome: Ongoing (interventions implemented as appropriate)

## 2019-09-04 NOTE — DISCHARGE SUMMARY
Discharge As      Date of Admisssion:  2019  Date of Death:  9/3/2019  Time of Death:  11:30 PM    Patient Care Team:  Simon Bay MD as PCP - General (Internal Medicine)    Final Diagnosis:   Rhinovirus infection  Stridor  Acute respiratory Failure with Hypercapnia   Chronic kidney disease  Hyponatremia  Mild SIADH      Presenting Problem/History of Present Illness  Visual hallucinations [R44.1]  Uncontrolled hypertension [I10]  Upper respiratory tract infection, unspecified type [J06.9]      Hospital Course  Patient was a 85 y.o. female presented with shortness of breath altered mental status and hallucinations.  She was found to have a viral upper respiratory infection with severe metabolic encephalopathy.  She also had some hyponatremia and studies were consistent with some mild SIADH.  She was started on steroids breathing treatments and supportive care and showed significant improvement over the first 24 hours in the hospital.  She was breathing better in the morning when I saw her on .  In the evening hours she worsened.  She developed significant stridor and hypoxia.  She was transferred to the ICU and placed on BiPAP.  She was given racemic epi and continued IV steroids however her symptoms did not improve.  Ultimately she did not tolerate the BiPAP very well and family chose to withdraw life-prolonging care she was transferred to palliative care where she passed away on September 3 and 11:30 PM      Oscar Holman MD  19  1:44 PM

## 2019-09-04 NOTE — PROGRESS NOTES
Case Management Discharge Note    Final Note: The patient  on 9/3/19 @ 23:30. SHERRY Vyas RN, CCP.     Destination      No service has been selected for the patient.      Durable Medical Equipment      No service has been selected for the patient.      Dialysis/Infusion      No service has been selected for the patient.      Home Medical Care      No service has been selected for the patient.      Therapy      No service has been selected for the patient.      Community Resources      No service has been selected for the patient.             Final Discharge Disposition Code: 20 -

## 2019-09-04 NOTE — PROGRESS NOTES
Discharge Planning Assessment  [facility]     Patient Name: Etelvina Bales  MRN: [id number]  Today's Date: 2019    Admit Date: 2019    Discharge Needs Assessment    No documentation.       Discharge Plan     Row Name 19 1418       Plan    Plan Comments  The patient transferred to [facility] from CCU on 9/3/19 @ 16:44. The patient was palliative. The patient  on 9/3/19 @ 23:30. SHERRY Vyas RN, CCP.     Final Discharge Disposition Code  20 -     Final Note  The patient  on 9/3/19 @ 23:30. SHERRY Vysa RN, CCP.         Destination      No service coordination in this encounter.      Durable Medical Equipment      No service coordination in this encounter.      Dialysis/Infusion      No service coordination in this encounter.      Home Medical Care      No service coordination in this encounter.      Therapy      No service coordination in this encounter.      Community Resources      No service coordination in this encounter.        Expected Discharge Date and Time     Expected Discharge Date Expected Discharge Time    Sep 4, 2019         Demographic Summary    No documentation.       Functional Status    No documentation.       Psychosocial    No documentation.       Abuse/Neglect    No documentation.       Legal    No documentation.       Substance Abuse    No documentation.       Patient Forms    No documentation.           Saida Vyas, OSCAR

## 2022-06-30 NOTE — TELEPHONE ENCOUNTER
Last ov 4/2018    Please advise about rf,thanks!   Tranexamic Acid Counseling:  Patient advised of the small risk of bleeding problems with tranexamic acid. They were also instructed to call if they developed any nausea, vomiting or diarrhea. All of the patient's questions and concerns were addressed.
